# Patient Record
Sex: FEMALE | NOT HISPANIC OR LATINO | Employment: OTHER | URBAN - METROPOLITAN AREA
[De-identification: names, ages, dates, MRNs, and addresses within clinical notes are randomized per-mention and may not be internally consistent; named-entity substitution may affect disease eponyms.]

---

## 2017-03-23 ENCOUNTER — OFFICE VISIT (OUTPATIENT)
Dept: NEUROLOGY | Facility: MEDICAL CENTER | Age: 52
End: 2017-03-23

## 2017-03-23 VITALS
DIASTOLIC BLOOD PRESSURE: 90 MMHG | TEMPERATURE: 98.2 F | OXYGEN SATURATION: 96 % | HEIGHT: 65 IN | WEIGHT: 163 LBS | HEART RATE: 71 BPM | BODY MASS INDEX: 27.16 KG/M2 | SYSTOLIC BLOOD PRESSURE: 138 MMHG

## 2017-03-23 PROCEDURE — 99204 OFFICE O/P NEW MOD 45 MIN: CPT | Performed by: PSYCHIATRY & NEUROLOGY

## 2017-03-23 NOTE — PROGRESS NOTES
NEUROLOGY INITIAL NOTE    Referring Physician  Joanna Calderon M.D.    Chief Complaint:  headache    History of Present Illness:   For how long have you had headaches? 20 years and progressively getting worse in the last 3 years.  How many years? 20 years  Where in the head does it hurt? Fronto-temporal eyes radiating to the back and sometimes starts in the neck.    Duration of each individual episode  Treated: 6-8 hours  Untreated: last days      How many days do you keep ANY type of headache in any given month?  3 or fewer days______   Between 3 and 6 days______   Between 6 and 10 days_____  Between 11 and 14 days____  15 or more headache/days per month__Yes___    Why do you think you started having headaches?  Back of the head injuries s/p mechanical fall 3-4 years ago.    How many days per month, on average, is the headache so severe that you must interrupt your routine?  MIDAS 90    What makes the headache better, if anything?  Sleep/rest    What makes the headache worse, if anything?  Lights    Do you have any associated symptoms with your headaches?  Nausea? _Yes____  Light sensitivity? __Yes___  Noise sensitivity? ___No__    Do you drink any caffeine?  Yes_____ No____    How many days per week? 3-4  2 or fewer days______  3 or more days__Yes____    Have you tried any preventive medications for your headaches? Which ones?  Melatonin on occasion        Screening for depression was performed: she denies being depressed        Medical History:  GERD  Gastritis    Family History:  Mother- Type 2DM, Stroke,Dementia    Social History:  Tobacco use:None  Etoh:None  Recreational :None    ROS:  Constitutional: Denies fevers, Denies weight changes   Eyes: Denies changes in vision, no eye pain   Ears/Nose/Throat/Mouth: Denies nasal congestion or sore throat   Cardiovascular: Denies chest pain or palpitations   Respiratory: Denies SOB.   Gastrointestinal/Hepatic: Denies abdominal pain, nausea, vomiting, diarrhea,  constipation or GI bleeding   Genitourinary: Denies bladder dysfunction, dysuria or frequency   Musculoskeletal/Rheum: Denies joint pain and swelling   Skin/Breast: Denies rash, denies breast lumps or discharge   Neurological: Denies headache, confusion, memory loss or focal weakness/parasthesias   Psychiatric: denies mood disorder.   Endocrine: denies hx of diabetes or thyroid dysfunction   Heme/Oncology/Lymph Nodes: Denies enlarged lymph nodes, denies brusing or known bleeding disorder   Allergic/Immunologic: Denies hx of allergies   Screening for depression was performed  All other systems were reviewed and are negative (AMA/CMS criteria)       Neurological Exam:  Orientation to time, place, and person were normal  Recent and remote memory were normal  Attention span and concentration were normal  Language (naming, repetition, spontaneous speech) was normal  Fund of knowledge was normal    All cranial nerves were normal: Pupils were equally round and reactive to light    Motor: (tone, bulk and strength): normal    Sensation (all modalities) was normal    Reflexes were normal and symmetrical in both upper and lower extremities    Coordination (finger-to-nose, heel/knee/shin, rapid alternating movements) was normal. There was no ataxia, no tremors, and no dysmetria. Station and gait were normal    Peripheral pulses were normal       NEUROIMAGING:   MRI of the brain:Minimal supratentorial white matter disease with only a few rare punctate foci of FLAIR hyperintensity in the subcortical white matter. Common findings for the patient's age. Doubtful clinical significance. Otherwise, unremarkable MRI of the brain without contrast, essentially normal for the patient's age.        EEG: None    Medications:  Current Outpatient Prescriptions   Medication   • OMEPRAZOLE PO   • tramadol (ULTRAM) 50 MG Tab   • ranitidine (ZANTAC) 150 MG Tab   • sumatriptan (IMITREX) 50 MG Tab   • meclizine (ANTIVERT) 25 MG Tab   •  "Estradiol-Estriol-Progesterone (BIEST/PROGESTERONE) Cream     No current facility-administered medications for this visit.       Blood pressure 138/90, pulse 71, temperature 36.8 °C (98.2 °F), height 1.651 m (5' 5\"), weight 73.936 kg (163 lb), SpO2 96 %.            @CMP@    Patient Active Problem List    Diagnosis Date Noted   • Hyperlipidemia 10/29/2012     Priority: Low   • Obesity (BMI 30-39.9) 11/21/2016   • History of motion sickness 06/09/2016   • Chronic migraine without aura without status migrainosus, not intractable 02/16/2016   • History of peptic ulcer 01/26/2016   • GERD (gastroesophageal reflux disease)          Diagn[place]:  [place]  [facility] [place]/Recommendations:  She will try vitamin B2 x 8 weeks.  She does not want to try AEDs, antidepressants, or anti-HTN meds.  She will come back only as needed.      Depression screening was  Done_____  Not Done__x___        J. Kev Lehman MD  Professor of Neurology and Pediatrics  [facility]  [facility]  "

## 2017-04-03 ENCOUNTER — OFFICE VISIT (OUTPATIENT)
Dept: MEDICAL GROUP | Facility: CLINIC | Age: 52
End: 2017-04-03

## 2017-04-03 VITALS
RESPIRATION RATE: 16 BRPM | SYSTOLIC BLOOD PRESSURE: 128 MMHG | DIASTOLIC BLOOD PRESSURE: 80 MMHG | TEMPERATURE: 99 F | HEIGHT: 65 IN | HEART RATE: 66 BPM | OXYGEN SATURATION: 96 %

## 2017-04-03 DIAGNOSIS — M54.50 ACUTE MIDLINE LOW BACK PAIN WITHOUT SCIATICA: ICD-10-CM
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
## 2017-04-03 DIAGNOSIS — S89.91XA RIGHT KNEE INJURY, INITIAL ENCOUNTER: ICD-10-CM

## 2017-04-03 PROCEDURE — 99213 OFFICE O/P EST LOW 20 MIN: CPT | Performed by: NURSE PRACTITIONER

## 2017-04-03 RX ORDER — OMEPRAZOLE 20 MG/1
20 CAPSULE, DELAYED RELEASE ORAL DAILY
COMMUNITY
End: 2018-02-09

## 2017-04-03 RX ORDER — CELECOXIB 200 MG/1
200 CAPSULE ORAL 2 TIMES DAILY
Qty: 60 CAP | Refills: 0 | Status: SHIPPED | OUTPATIENT
Start: 2017-04-03 | End: 2017-06-01

## 2017-04-03 ASSESSMENT — ENCOUNTER SYMPTOMS
FEVER: 0
CHILLS: 0
FALLS: 1
TINGLING: 0
BACK PAIN: 1

## 2017-04-03 ASSESSMENT — PAIN SCALES - GENERAL: PAINLEVEL: 8=MODERATE-SEVERE PAIN

## 2017-04-03 NOTE — PROGRESS NOTES
Chief Complaint   Patient presents with   • Knee Pain     R knee pain with radiation to tail bone and below knee/ left knee lump  patient fell 3 weeks       HISTORY OF PRESENT ILLNESS: Patient is a 51 y.o. female established patient who presents today due to lower back pain, right knee pain and concern about her left knee. Pt reports that she fell about 2 days ago. She fell backward, land on her back with right knee bend to the back and straight left leg. Pt did not hear any pop sound but her right knee pain has gone worse. She is still able to walk without any assistance and able to place weight on both knee but it is super tender with pain level 8/10 to her right knee. The swelling to the right knee seems to get worse a little bit too. She has been using ice packing. She has tramadol for headache so she also took tramadol for her knee pain but it seems not to help at all. She also took ibuprofen but she is afraid to take too much because she gets stomach upset. She also feels that her left knee's bone seems to protrude a little bit but there is no limit on ROM and she is able to bend her left knee. She has dull pain to her tail bone.      Patient Active Problem List    Diagnosis Date Noted   • Hyperlipidemia 10/29/2012     Priority: Low   • Obesity (BMI 30-39.9) 11/21/2016   • History of motion sickness 06/09/2016   • Chronic migraine without aura without status migrainosus, not intractable 02/16/2016   • History of peptic ulcer 01/26/2016   • GERD (gastroesophageal reflux disease)        Allergies:Hydrocodone and Oxycodone    Current Outpatient Prescriptions   Medication Sig Dispense Refill   • omeprazole (PRILOSEC) 20 MG delayed-release capsule Take 20 mg by mouth every day.     • celecoxib (CELEBREX) 200 MG Cap Take 1 Cap by mouth 2 times a day. 60 Cap 0   • tramadol (ULTRAM) 50 MG Tab TAKE ONE TABLET BY MOUTH EVERY 6 HOURS AS NEEDED FOR MIGRAINE PAIN 60 Tab 3   • ranitidine (ZANTAC) 150 MG Tab Take 1 Tab by  "mouth every day. 30 Tab 6   • OMEPRAZOLE PO Take  by mouth.     • sumatriptan (IMITREX) 50 MG Tab Take 1-2 Tabs by mouth Once PRN for Migraine for up to 1 dose. 10 Tab 3   • meclizine (ANTIVERT) 25 MG Tab Take 1 Tab by mouth 3 times a day as needed. 30 Tab 0   • Estradiol-Estriol-Progesterone (BIEST/PROGESTERONE) Cream Apply 1 g to skin as directed every day. 30 g 2     No current facility-administered medications for this visit.       Social History   Substance Use Topics   • Smoking status: Never Smoker    • Smokeless tobacco: Never Used   • Alcohol Use: No       No family status information on file.     Family History   Problem Relation Age of Onset   • Heart Attack Mother    • Stroke Mother    • Lung Disease Mother    • Hypertension Mother    • Diabetes Mother      type II   • Dementia Mother          ROS:  Review of Systems   Constitutional: Negative for fever and chills.   Musculoskeletal: Positive for back pain, joint pain ( right knee pain more than left knee, able to bear the weight but right knee's swelling getting worse) and falls.   Neurological: Negative for tingling.        Objective:     Blood pressure 128/80, pulse 66, temperature 37.2 °C (99 °F), resp. rate 16, height 1.651 m (5' 5\"), SpO2 96 %.     Physical Exam:  Physical Exam   Constitutional: She is oriented to person, place, and time and well-developed, well-nourished, and in no distress.   HENT:   Head: Normocephalic and atraumatic.   Eyes: Conjunctivae are normal.   Neck: Neck supple. No JVD present.   Cardiovascular: Normal rate.    Musculoskeletal: She exhibits edema ( to right knee) and tenderness ( very tender to right knee, swelling to right lateral knee, unable to do much PE due to the pain. Left knee bone protrusion?? per pt, no abnormality noted, no swelling, no effusion, less tender, able to bend her knee, able to walk without assistance).   She has dull pain to her tail bone, no bruise or swelling noted to surrounding area. The pain " is reproducible. She mostly feels pain, no tingling or numbness. No saddle anesthesia.    Neurological: She is alert and oriented to person, place, and time.   Skin: Skin is warm.   Vitals reviewed.        Assessment/Plan:  1. Right knee injury, initial encounter, unable to perform exam due to the tenderness and swelling  - MR-KNEE-W/O RIGHT; Future  - celecoxib (CELEBREX) 200 MG Cap; Take 1 Cap by mouth 2 times a day.  Dispense: 60 Cap; Refill: 0  - elevated right leg, heating pad.     2. Fall, initial encounter  - DX-KNEE 2- LEFT; Future, does not seem to have deformity or fracture clinically but pt would like to be confirmed by image test  - DX-LUMBAR SPINE-2 OR 3 VIEWS; Future  - celecoxib (CELEBREX) 200 MG Cap; Take 1 Cap by mouth 2 times a day.  Dispense: 60 Cap; Refill: 0    3. Acute midline low back pain without sciatica  - DX-KNEE 2- LEFT; Future  - DX-LUMBAR SPINE-2 OR 3 VIEWS; Future  - celecoxib (CELEBREX) 200 MG Cap; Take 1 Cap by mouth 2 times a day.  Dispense: 60 Cap; Refill: 0

## 2017-04-03 NOTE — MR AVS SNAPSHOT
"Flavia Pena   4/3/2017 2:00 PM   Office Visit   MRN: [id number]    Department:  [facility]   Dept Phone:  [phone]    Description:  Female : 1965   Provider:  DENI Vaughn           Reason for Visit     Knee Pain R knee pain with radiation to tail bone and below knee/ left knee lump  patient fell 3 weeks      Allergies as of 4/3/2017     Allergen Noted Reactions    Hydrocodone 2016   Itching    Oxycodone 2016   Itching      You were diagnosed with     Right knee injury, initial encounter   [242549]       Fall, initial encounter   [[id number]]       Acute midline low back pain without sciatica   [[id number]]         Vital Signs     Blood Pressure Pulse Temperature Respirations Height       128/80 mmHg 66 37.2 °C (99 °F) 16 1.651 m (5' 5\")     Oxygen Saturation Smoking Status                96% Never Smoker           Basic Information     Date Of Birth Sex Race Ethnicity Preferred Language    1965 Female Unknown Non- English      Your appointments     2017  1:00 PM   Established Patient with Joanna Calderon M.D.   [facility]    [address] [place] [address] [address]  [facility] [id number] [phone]           You will be receiving a confirmation call a few days before your appointment from our automated call confirmation system.              Problem List              ICD-10-CM Priority Class Noted - Resolved    Hyperlipidemia E78.5 Low  10/29/2012 - Present    GERD (gastroesophageal reflux disease) K21.9   Unknown - Present    History of peptic ulcer Z87.11   2016 - Present    Chronic migraine without aura without status migrainosus, not intractable G43.709   2016 - Present    History of motion sickness Z87.898   2016 - Present    Obesity (BMI 30-39.9) E66.9   2016 - Present      Health Maintenance        Date Due Completion Dates    IMM DTaP/Tdap/Td Vaccine (1 - Tdap) 1983 ---    MAMMOGRAM 2016, " 8/11/2015, 8/11/2015, 7/30/2015, 11/22/2011    PAP SMEAR 6/19/2018 6/19/2015 (Prv Comp)    Override on 6/19/2015: Previously completed    COLONOSCOPY 3/1/2021 3/1/2011 (Prv Comp)    Override on 3/1/2011: Previously completed            Current Immunizations     No immunizations on file.      Below and/or attached are the medications your provider expects you to take. Review all of your home medications and newly ordered medications with your provider and/or pharmacist. Follow medication instructions as directed by your provider and/or pharmacist. Please keep your medication list with you and share with your provider. Update the information when medications are discontinued, doses are changed, or new medications (including over-the-counter products) are added; and carry medication information at all times in the event of emergency situations     Allergies:  HYDROCODONE - Itching     OXYCODONE - Itching               Medications  Valid as of: April 03, 2017 -  2:33 PM    Generic Name Brand Name Tablet Size Instructions for use    Celecoxib (Cap) CELEBREX 200 MG Take 1 Cap by mouth 2 times a day.        Estradiol-Estriol-Progesterone (Cream) BIEST/PROGESTERONE  Apply 1 g to skin as directed every day.        Meclizine HCl (Tab) ANTIVERT 25 MG Take 1 Tab by mouth 3 times a day as needed.        Omeprazole   Take  by mouth.        Omeprazole (CAPSULE DELAYED RELEASE) PRILOSEC 20 MG Take 20 mg by mouth every day.        RaNITidine HCl (Tab) ZANTAC 150 MG Take 1 Tab by mouth every day.        SUMAtriptan Succinate (Tab) IMITREX 50 MG Take 1-2 Tabs by mouth Once PRN for Migraine for up to 1 dose.        TraMADol HCl (Tab) ULTRAM 50 MG TAKE ONE TABLET BY MOUTH EVERY 6 HOURS AS NEEDED FOR MIGRAINE PAIN        .                 Medicines prescribed today were sent to:     [facility] [place] (), [place] - [address]    [address] [place] [address] [place] () [place] [address]    Phone: [phone] Fax: [phone]    Open 24  Hours?: No      Medication refill instructions:       If your prescription bottle indicates you have medication refills left, it is not necessary to call your provider’s office. Please contact your pharmacy and they will refill your medication.    If your prescription bottle indicates you do not have any refills left, you may request refills at any time through one of the following ways: The online [employer] system (except Urgent Care), by calling your provider’s office, or by asking your pharmacy to contact your provider’s office with a refill request. Medication refills are processed only during regular business hours and may not be available until the next business day. Your provider may request additional information or to have a follow-up visit with you prior to refilling your medication.   *Please Note: Medication refills are assigned a new Rx number when refilled electronically. Your pharmacy may indicate that no refills were authorized even though a new prescription for the same medication is available at the pharmacy. Please request the medicine by name with the pharmacy before contacting your provider for a refill.        Your To Do List     Future Labs/Procedures Complete By Expires    DX-KNEE 2- LEFT  As directed 4/3/2018    DX-LUMBAR SPINE-2 OR 3 VIEWS  As directed 4/3/2018    MR-KNEE-W/O RIGHT  As directed 4/3/2018         [email or web address] Access Code: Activation code not generated  Current [employer] Status: Active

## 2017-04-04 ENCOUNTER — HOSPITAL ENCOUNTER (OUTPATIENT)
Dept: RADIOLOGY | Facility: MEDICAL CENTER | Age: 52
End: 2017-04-04
Attending: NURSE PRACTITIONER
Payer: COMMERCIAL
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
## 2017-04-04 DIAGNOSIS — M54.50 ACUTE MIDLINE LOW BACK PAIN WITHOUT SCIATICA: ICD-10-CM

## 2017-04-04 PROCEDURE — 72100 X-RAY EXAM L-S SPINE 2/3 VWS: CPT

## 2017-04-04 PROCEDURE — 73560 X-RAY EXAM OF KNEE 1 OR 2: CPT | Mod: LT

## 2017-06-01 ENCOUNTER — OFFICE VISIT (OUTPATIENT)
Dept: MEDICAL GROUP | Facility: CLINIC | Age: 52
End: 2017-06-01
Payer: COMMERCIAL

## 2017-06-01 VITALS
SYSTOLIC BLOOD PRESSURE: 140 MMHG | RESPIRATION RATE: 16 BRPM | DIASTOLIC BLOOD PRESSURE: 80 MMHG | HEIGHT: 65 IN | HEART RATE: 70 BPM | OXYGEN SATURATION: 99 % | TEMPERATURE: 98.1 F

## 2017-06-01 DIAGNOSIS — Z12.39 SCREENING BREAST EXAMINATION: ICD-10-CM

## 2017-06-01 DIAGNOSIS — G43.709 CHRONIC MIGRAINE WITHOUT AURA WITHOUT STATUS MIGRAINOSUS, NOT INTRACTABLE: ICD-10-CM

## 2017-06-01 PROCEDURE — 99213 OFFICE O/P EST LOW 20 MIN: CPT | Performed by: FAMILY MEDICINE

## 2017-06-01 RX ORDER — PROMETHAZINE HYDROCHLORIDE 25 MG/1
25 SUPPOSITORY RECTAL EVERY 6 HOURS PRN
Qty: 12 SUPPOSITORY | Refills: 0 | Status: SHIPPED | OUTPATIENT
Start: 2017-06-01 | End: 2019-07-29

## 2017-06-01 RX ORDER — TOPIRAMATE 25 MG/1
25 TABLET ORAL 2 TIMES DAILY
Qty: 60 TAB | Refills: 6 | Status: SHIPPED | OUTPATIENT
Start: 2017-06-01 | End: 2018-02-09

## 2017-06-01 RX ORDER — TRAMADOL HYDROCHLORIDE 50 MG/1
100 TABLET ORAL EVERY 12 HOURS PRN
Qty: 120 TAB | Refills: 5 | Status: SHIPPED | OUTPATIENT
Start: 2017-06-01 | End: 2017-12-26 | Stop reason: SDUPTHER

## 2017-06-01 ASSESSMENT — PATIENT HEALTH QUESTIONNAIRE - PHQ9: CLINICAL INTERPRETATION OF PHQ2 SCORE: 1

## 2017-06-01 NOTE — PROGRESS NOTES
CC: Migraine    HPI:   Flavia presents today with the following.    1. Chronic migraine without aura without status migrainosus, not intractable  She is having migraine headaches daily. She has severe migraines with nausea, vomiting, photophobia and phonophobia about 4 times a month. These seem to start in the back of the neck. Her daily migraine is relatively mild and controlled by use of the tramadol. She is also having to use high-dose ibuprofen and Tylenol which are creating significant GI upset for her. She is out of the tramadol. She would like to use it again but is concerned that she was having some tingling in the arms but went away when she ran out of the medication. She did see neurology. She did not understand what she was being offered and was confused with the list of medications. Have discussed with her the 3 phases of treatment of migraine including prevention, treatment and rescue. Unfortunately her onset of migraine is less than 15 minutes from the time she gets a warning. The warning is primarily a feeling of pressure in the back of her neck. She does not get a visual aura or other warning.  She has tried various stretching exercises and ice and heat to the neck and that does not seem to make a significant difference.  She did use the Imitrex spray in the past with no significant relief. She has been having quite a bit of protracted vomiting. She has tried meclizine for this in the past with no help. She does not recall ever trying Phenergan.    2. Screening breast examination  She actually had this done in August and is planning to do this in August again. She will be seeing her gynecologist around that time.      Patient Active Problem List    Diagnosis Date Noted   • Hyperlipidemia 10/29/2012     Priority: Low   • Obesity (BMI 30-39.9) 11/21/2016   • History of motion sickness 06/09/2016   • Chronic migraine without aura without status migrainosus, not intractable 02/16/2016   • History of  "peptic ulcer 01/26/2016   • GERD (gastroesophageal reflux disease)        Current Outpatient Prescriptions   Medication Sig Dispense Refill   • topiramate (TOPAMAX) 25 MG Tab Take 1 Tab by mouth 2 times a day. 60 Tab 6   • promethazine (PHENERGAN) 25 MG Suppos Insert 1 Suppository in rectum every 6 hours as needed for Nausea/Vomiting. 12 Suppository 0   • tramadol (ULTRAM) 50 MG Tab Take 2 Tabs by mouth every 12 hours as needed (migraine). 120 Tab 5   • omeprazole (PRILOSEC) 20 MG delayed-release capsule Take 20 mg by mouth every day.     • meclizine (ANTIVERT) 25 MG Tab Take 1 Tab by mouth 3 times a day as needed. 30 Tab 0   • ranitidine (ZANTAC) 150 MG Tab Take 1 Tab by mouth every day. 30 Tab 6   • Estradiol-Estriol-Progesterone (BIEST/PROGESTERONE) Cream Apply 1 g to skin as directed every day. 30 g 2     No current facility-administered medications for this visit.         Allergies as of 06/01/2017 - Anders as Reviewed 06/01/2017   Allergen Reaction Noted   • Hydrocodone Itching 05/18/2016   • Oxycodone Itching 05/18/2016        ROS: As per HPI.    /80 mmHg  Pulse 70  Temp(Src) 36.7 °C (98.1 °F)  Resp 16  Ht 1.651 m (5' 5\")  Wt   SpO2 99%    Physical Exam:  Gen:         Alert and oriented, No apparent distress.  Lucid and fluent.  HEENT:   EOMI, PERRLA.  Oropharynx well hydrated.  Neck:       Posterior cervical spasm, range of motion is full, no JVD or carotid bruits appreciated. No cervical adenopathy or neck mass appreciated.  Lungs:     Clear to auscultation bilaterally  CV:          Regular rate and rhythm. No murmurs, rubs or gallops.               Ext:          No clubbing, cyanosis, edema.      Assessment and Plan.   51 y.o. female with the following issues.    1. Chronic migraine without aura without status migrainosus, not intractable  Medications are discussed in detail. I have urged her to give the topiramate to try. With the frequency and severity of her migraines preventative medications " are going to be of the most help to her. The Imitrex has not been helpful for treatment. She is urged to continue using the Coca[employer] and 2 aspirin when appropriate. She is also using the tramadol for rescue. If the symptoms with the tramadol return she will let me know and we will try something else.  - topiramate (TOPAMAX) 25 MG Tab; Take 1 Tab by mouth 2 times a day.  Dispense: 60 Tab; Refill: 6  - promethazine (PHENERGAN) 25 MG Suppos; Insert 1 Suppository in rectum every 6 hours as needed for Nausea/Vomiting.  Dispense: 12 Suppository; Refill: 0  - tramadol (ULTRAM) 50 MG Tab; Take 2 Tabs by mouth every 12 hours as needed (migraine).  Dispense: 120 Tab; Refill: 5    2. Screening breast examination  She has actually already satisfied this, order placed in case she wants to use that for August but she does prefer using the order from her gynecologist.  - MA-MAMMO SCREENING BILAT W/NAKUL W/CAD; Future

## 2017-06-01 NOTE — MR AVS SNAPSHOT
"        Flavia Hansenso   2017 10:00 AM   Office Visit   MRN: [id number]    Department:  [facility]   Dept Phone:  [phone]    Description:  Female : 1965   Provider:  Joanna Calderon M.D.           Reason for Visit     Migraine           Allergies as of 2017     Allergen Noted Reactions    Hydrocodone 2016   Itching    Oxycodone 2016   Itching      You were diagnosed with     Chronic migraine without aura without status migrainosus, not intractable   [[id number]]       Screening breast examination   [[id number]]         Vital Signs     Blood Pressure Pulse Temperature Respirations Height       140/80 mmHg 70 36.7 °C (98.1 °F) 16 1.651 m (5' 5\")     Oxygen Saturation Smoking Status                99% Never Smoker           Basic Information     Date Of Birth Sex Race Ethnicity Preferred Language    1965 Female Unknown Non- English      Problem List              ICD-10-CM Priority Class Noted - Resolved    Hyperlipidemia E78.5 Low  10/29/2012 - Present    GERD (gastroesophageal reflux disease) K21.9   Unknown - Present    History of peptic ulcer Z87.11   2016 - Present    Chronic migraine without aura without status migrainosus, not intractable G43.709   2016 - Present    History of motion sickness Z87.898   2016 - Present    Obesity (BMI 30-39.9) E66.9   2016 - Present      Health Maintenance        Date Due Completion Dates    IMM DTaP/Tdap/Td Vaccine (1 - Tdap) 2022 (Originally 1983) ---    MAMMOGRAM 2017 (Done), 2015, 2011    Override on 2016: Done    PAP SMEAR 2018 (Prv Comp)    Override on 2015: Previously completed    COLONOSCOPY 3/1/2021 3/1/2011 (Prv Comp)    Override on 3/1/2011: Previously completed            Current Immunizations     No immunizations on file.      Below and/or attached are the medications your provider expects you to take. Review all of your home medications and " newly ordered medications with your provider and/or pharmacist. Follow medication instructions as directed by your provider and/or pharmacist. Please keep your medication list with you and share with your provider. Update the information when medications are discontinued, doses are changed, or new medications (including over-the-counter products) are added; and carry medication information at all times in the event of emergency situations     Allergies:  HYDROCODONE - Itching     OXYCODONE - Itching               Medications  Valid as of: June 01, 2017 - 12:46 PM    Generic Name Brand Name Tablet Size Instructions for use    Estradiol-Estriol-Progesterone (Cream) BIEST/PROGESTERONE  Apply 1 g to skin as directed every day.        Omeprazole (CAPSULE DELAYED RELEASE) PRILOSEC 20 MG Take 20 mg by mouth every day.        Promethazine HCl (Suppos) PHENERGAN 25 MG Insert 1 Suppository in rectum every 6 hours as needed for Nausea/Vomiting.        RaNITidine HCl (Tab) ZANTAC 150 MG Take 1 Tab by mouth every day.        Topiramate (Tab) TOPAMAX 25 MG Take 1 Tab by mouth 2 times a day.        TraMADol HCl (Tab) ULTRAM 50 MG Take 2 Tabs by mouth every 12 hours as needed (migraine).        .                 Medicines prescribed today were sent to:     [facility] [address] [place] [address] (), [address] () [place] [address]    Phone: [phone] Fax: [phone]    Open 24 Hours?: No      Medication refill instructions:       If your prescription bottle indicates you have medication refills left, it is not necessary to call your provider’s office. Please contact your pharmacy and they will refill your medication.    If your prescription bottle indicates you do not have any refills left, you may request refills at any time through one of the following ways: The online [employer] system (except Urgent Care), by calling your provider’s office, or by asking your pharmacy to contact your provider’s  office with a refill request. Medication refills are processed only during regular business hours and may not be available until the next business day. Your provider may request additional information or to have a follow-up visit with you prior to refilling your medication.   *Please Note: Medication refills are assigned a new Rx number when refilled electronically. Your pharmacy may indicate that no refills were authorized even though a new prescription for the same medication is available at the pharmacy. Please request the medicine by name with the pharmacy before contacting your provider for a refill.        Your To Do List     Future Labs/Procedures Complete By Expires    MA-MAMMO SCREENING BILAT W/NAKUL W/CAD  As directed 6/1/2018         [employer] Access Code: Activation code not generated  Current [employer] Status: Active

## 2017-09-06 ENCOUNTER — OFFICE VISIT (OUTPATIENT)
Dept: MEDICAL GROUP | Facility: CLINIC | Age: 52
End: 2017-09-06
Payer: COMMERCIAL

## 2017-09-06 VITALS
OXYGEN SATURATION: 92 % | SYSTOLIC BLOOD PRESSURE: 130 MMHG | WEIGHT: 191 LBS | HEIGHT: 65 IN | HEART RATE: 66 BPM | DIASTOLIC BLOOD PRESSURE: 80 MMHG | BODY MASS INDEX: 31.82 KG/M2 | RESPIRATION RATE: 16 BRPM | TEMPERATURE: 97.9 F

## 2017-09-06 DIAGNOSIS — R82.998 URINE LEUKOCYTES: ICD-10-CM

## 2017-09-06 DIAGNOSIS — R61 NIGHT SWEATS: ICD-10-CM

## 2017-09-06 DIAGNOSIS — R10.2 PELVIC PAIN: ICD-10-CM

## 2017-09-06 DIAGNOSIS — E66.9 OBESITY (BMI 30-39.9): ICD-10-CM

## 2017-09-06 PROCEDURE — 81002 URINALYSIS NONAUTO W/O SCOPE: CPT | Performed by: FAMILY MEDICINE

## 2017-09-06 PROCEDURE — 99214 OFFICE O/P EST MOD 30 MIN: CPT | Performed by: FAMILY MEDICINE

## 2017-09-06 RX ORDER — SULFAMETHOXAZOLE AND TRIMETHOPRIM 800; 160 MG/1; MG/1
1 TABLET ORAL 2 TIMES DAILY
Qty: 14 TAB | Refills: 0 | Status: SHIPPED | OUTPATIENT
Start: 2017-09-06 | End: 2017-09-13

## 2017-09-06 NOTE — PROGRESS NOTES
CC: Pelvic pain, urinary problems, night sweats    HPI:   Flavia presents today with the following.    1. Pelvic pain  She has had persistent pelvic pain now for the last 2-3 weeks or longer. This is right sided. This is not affected by eating or by position. She recalls lifting nothing heavy.  Discussed lab testing in general testing including possible CT or ultrasound.     2. Frequent urination  She has frequent urination and some pressure in the last 2 weeks. This seems to be tied in with her pelvic pain. She denies actual burning per se. She denies any change in color. She feels the smell is more intense. Since from the location of her pain she thought it was bladder related she took one Bactrim DS that was left over. She took this about 5 days ago. And then for the last 4 days she has been taking leftover tetracycline. She is not sure how old it is. She will check the bottle at home. She will let me know if this is greater than 3 years as we may need to check her kidneys urgently. I think she should get lab tests to check this anyway, discussed with patient.    3. Night sweats  Over the last 2 weeks or so she has noted occasional soaking night sweats. I will note that her  acts as if he thinks she is exaggerating. Patient is very concerned over a fear of cancer. She does state this bothers her a lot. She had a hysterectomy nearly 30 years ago with bilateral oophorectomy for very severe endometriosis. She is not on oral hormones and has not been for over 10 years. She was on a transdermal cream but has stopped this.    4. Obesity (BMI 30-39.9)  She is making good progress with her weight loss. She has lost another 4 pounds compared to the last time she would let us weigh. She is active with her animals. Discussed that just a few more pounds weight loss and she will no longer be in the obese category. She is encouraged.    Her  had very severe medical problems this last year which have increased  "her anxiety.        Patient Active Problem List    Diagnosis Date Noted   • Hyperlipidemia 10/29/2012     Priority: Low   • Obesity (BMI 30-39.9) 11/21/2016   • History of motion sickness 06/09/2016   • Chronic migraine without aura without status migrainosus, not intractable 02/16/2016   • History of peptic ulcer 01/26/2016   • GERD (gastroesophageal reflux disease)        Current Outpatient Prescriptions   Medication Sig Dispense Refill   • sulfamethoxazole-trimethoprim (BACTRIM DS) 800-160 MG tablet Take 1 Tab by mouth 2 times a day for 7 days. 14 Tab 0   • topiramate (TOPAMAX) 25 MG Tab Take 1 Tab by mouth 2 times a day. 60 Tab 6   • promethazine (PHENERGAN) 25 MG Suppos Insert 1 Suppository in rectum every 6 hours as needed for Nausea/Vomiting. 12 Suppository 0   • tramadol (ULTRAM) 50 MG Tab Take 2 Tabs by mouth every 12 hours as needed (migraine). 120 Tab 5   • omeprazole (PRILOSEC) 20 MG delayed-release capsule Take 20 mg by mouth every day.     • ranitidine (ZANTAC) 150 MG Tab Take 1 Tab by mouth every day. 30 Tab 6   • Estradiol-Estriol-Progesterone (BIEST/PROGESTERONE) Cream Apply 1 g to skin as directed every day. 30 g 2     No current facility-administered medications for this visit.          Allergies as of 09/06/2017 - Reviewed 09/06/2017   Allergen Reaction Noted   • Hydrocodone Itching 05/18/2016   • Oxycodone Itching 05/18/2016        ROS: As per HPI.    /80   Pulse 66   Temp 36.6 °C (97.9 °F)   Resp 16   Ht 1.651 m (5' 5\")   Wt 86.6 kg (191 lb)   SpO2 92%   BMI 31.78 kg/m²     Physical Exam:  Gen:         Alert and oriented, No apparent distress.  Lucid and fluent.  Neck:       Full range of motion, no JVD or carotid bruits appreciated. There is one enlarged lymph node in the left posterior cervical chain. There are several very shotty lymph nodes which are quite small, less than a half centimeter in the left anterior chain. No lymphadenopathy is appreciated in the right posterior " cervical chain or the right anterior cervical chain. There is no adenopathy appreciated bilaterally in the supraclavicular region. No neck mass is appreciated. No thyromegaly is appreciated.  Lungs:     Clear to auscultation bilaterally  CV:          Regular rate and rhythm. No murmurs, rubs or gallops.      Abd:         Soft, Mild bloating, no hepatosplenomegaly or masses noted. The right lower pelvis just to the right of midline is tender. No erythema appreciated. No mass or edema appreciated. There is no rebound though there is slight guarding.          Ext:          No clubbing, cyanosis, no peripheral edema.    Lab Results   Component Value Date/Time    POCCOLOR yellow 09/07/2017 09:54 AM    POCAPPEAR clear 09/07/2017 09:54 AM    POCLEUKEST tr 09/07/2017 09:54 AM    POCNITRITE neg 09/07/2017 09:54 AM    POCUROBILIGE neg 09/07/2017 09:54 AM    POCPROTEIN tr 09/07/2017 09:54 AM    POCURPH 6.0 09/07/2017 09:54 AM    POCBLOOD neg 09/07/2017 09:54 AM    POCSPGRV 1.005 09/07/2017 09:54 AM    POCKETONES neg 09/07/2017 09:54 AM    POCBILIRUBIN neg 09/07/2017 09:54 AM    POCGLUCUA neg 09/07/2017 09:54 AM      Assessment and Plan.   52 y.o. female with the following issues.      1. Pelvic pain  UA shows trace leukocytes but is otherwise negative. Urine is very dilute. Patient has been stopped taking various leftover antibiotics. Orders are discussed and placed. CT order for abdomen and pelvis is discussed and written. Her insurance requires her to use [facility] for the best coverage.  - CBC WITH DIFFERENTIAL; Future  - COMP METABOLIC PANEL; Future  - POCT Urinalysis    2. Urine leukocytes  Orders discussed and placed. She has been taking several antibiotics so I will treat presumptively with Bactrim DS twice a day for a week. If she continues to have symptoms after that we will have a repeat urine test with culture after 5 days of no antibiotics.  - sulfamethoxazole-trimethoprim (BACTRIM DS) 800-160 MG tablet; Take 1 Tab by  mouth 2 times a day for 7 days.  Dispense: 14 Tab; Refill: 0  - CBC WITH DIFFERENTIAL; Future  - COMP METABOLIC PANEL; Future  - POCT Urinalysis    3. Night sweats  Orders are discussed and placed. Patient had hysterectomy many years ago and it is unlikely that she is undergoing menopause. This may be illness associated.    4. Obesity (BMI 30-39.9)  She has been making progress with her weight loss and is doing well overall.  - Patient identified as having weight management issue.  Appropriate orders and counseling given.

## 2017-09-07 ENCOUNTER — TELEPHONE (OUTPATIENT)
Dept: MEDICAL GROUP | Facility: CLINIC | Age: 52
End: 2017-09-07

## 2017-09-07 LAB
APPEARANCE UR: CLEAR
BILIRUB UR STRIP-MCNC: NORMAL MG/DL
COLOR UR AUTO: YELLOW
GLUCOSE UR STRIP.AUTO-MCNC: NORMAL MG/DL
KETONES UR STRIP.AUTO-MCNC: NORMAL MG/DL
LEUKOCYTE ESTERASE UR QL STRIP.AUTO: NORMAL
NITRITE UR QL STRIP.AUTO: NORMAL
PH UR STRIP.AUTO: 6 [PH] (ref 5–8)
PROT UR QL STRIP: NORMAL MG/DL
RBC UR QL AUTO: NORMAL
SP GR UR STRIP.AUTO: 1
UROBILINOGEN UR STRIP-MCNC: NORMAL MG/DL

## 2017-09-07 NOTE — TELEPHONE ENCOUNTER
(Voicemail) Waleska with [facility] called needs the CT order to be change to CT of abdomen & pelvis with only. They don't do with or without because this is double the radiation and their protocol is only with. Waleska will fax the order back again. Thank you

## 2017-09-07 NOTE — TELEPHONE ENCOUNTER
Pt's  called to state pt isn't doing better. Feeling weak, had labs done this morning. Has been vomiting on and off today. Pt feels like she is having SOB, no chest pain. States this has been going on since shes been sick but has gotten worse today.      states they would like to avoid the ER as much as possible (would cost them $1000) but would be willing to go to .

## 2017-09-08 RX ORDER — ONDANSETRON 4 MG/1
4 TABLET, FILM COATED ORAL EVERY 6 HOURS PRN
Qty: 20 TAB | Refills: 2 | Status: SHIPPED | OUTPATIENT
Start: 2017-09-08 | End: 2019-07-29

## 2017-09-08 NOTE — TELEPHONE ENCOUNTER
Called pt for an update of patients health status. Per pt she is scheduled for her CT nxt week and is no longer having vomiting or sob. Pt does state she is having some nausea and is requesting a prescription for zofran. Please advise, thank you.

## 2017-09-08 NOTE — TELEPHONE ENCOUNTER
The CT change is just fine with me. As you know, I signed it earlier today and I saw you fax it I am glad you are faxing it again.

## 2017-09-13 ENCOUNTER — PATIENT MESSAGE (OUTPATIENT)
Dept: MEDICAL GROUP | Facility: CLINIC | Age: 52
End: 2017-09-13

## 2017-09-13 DIAGNOSIS — R10.11 RIGHT UPPER QUADRANT PAIN: ICD-10-CM

## 2017-09-13 DIAGNOSIS — K76.89 HEPATIC CYST: ICD-10-CM

## 2017-09-13 DIAGNOSIS — R39.198 VOIDING DIFFICULTY: ICD-10-CM

## 2017-09-13 DIAGNOSIS — Z87.11 HISTORY OF PEPTIC ULCER: ICD-10-CM

## 2017-09-13 DIAGNOSIS — K21.9 GASTROESOPHAGEAL REFLUX DISEASE, ESOPHAGITIS PRESENCE NOT SPECIFIED: ICD-10-CM

## 2017-09-14 ENCOUNTER — PATIENT MESSAGE (OUTPATIENT)
Dept: MEDICAL GROUP | Facility: CLINIC | Age: 52
End: 2017-09-14

## 2017-09-15 ENCOUNTER — PATIENT MESSAGE (OUTPATIENT)
Dept: MEDICAL GROUP | Facility: CLINIC | Age: 52
End: 2017-09-15

## 2017-09-15 NOTE — TELEPHONE ENCOUNTER
From: Flavia Pena  To: Joanna Calderon M.D.  Sent: 9/14/2017 8:02 PM PDT  Subject: RE:the liver cysts are very unlikely to be causing pain    Yes please send it to [facility] also I was never told I had a peptic ulcer all they said was that I had no mucosa lining in my stomach they never said I had a peptic ulcer.  ----- Message -----  From: Joanna Calderon M.D.  Sent: 9/14/2017 6:50 PM PDT  To: Flavia Pena  Subject: the liver cysts are very unlikely to be causing pain  It is unlikely that the liver cysts are causing the pain. As the radiologist says, statistically these are benign. However, I agree that we need to further characterize. There are four in the left lobe of the liver and one in the right. The gastroenterologist will look at your films when they see you. We will also be able to see if there were bladder stones provided that the films show that region. It is still most likely that your pain is related to your previous peptic ulcer disease and possible continuing stomach issues. I have placed the gastroenterology referral. I would like to suggest an ultrasound of the bladder and pelvic area. I will placed that order as well. I presume I should send that order to [facility]. Am sending the MRI order there as well.

## 2017-09-15 NOTE — TELEPHONE ENCOUNTER
From: Flavia Pena  To: Joanna Calderon M.D.  Sent: 9/15/2017 7:57 AM PDT  Subject: RE:i have sent it to [facility]    Ok thank you!   Since I had a CT scan with contrast of my abdomen and pelvis, doesn't that be tell you everything you need to know about my pancreas, liver, bladder, kidneys and colon? So why would I need an MRI and ultrasound?  ----- Message -----  From: Joanna Calderon M.D.  Sent: 9/14/2017 10:18 PM PDT  To: Flavia Pena  Subject: i have sent it to [facility]  The orders have been sent. Actually a peptic ulcer is a loss of lining of the stomach.

## 2017-09-16 NOTE — TELEPHONE ENCOUNTER
From: Flavia Pena  To: Joanna Calderon M.D.  Sent: 9/15/2017 8:55 AM PDT  Subject: RE:MRI    I just spoke with the office of Dr. Mckeon and they can't get me in until November 2nd but they said to have my doctor call them and you can get me in sooner.  .  ----- Message -----  From: Joanna Calderon M.D.  Sent: 9/15/2017 8:02 AM PDT  To: Flavia Pena  Subject: MRI  No, the liver MRI gives much more detail. That has been suggested by the radiologist but also when speaking to gastroenterologist about this kind of issue in the past they invariably want an MRI to give more liver detail. That typically will settle the question whether this is a benign process or a problem. If we had known in the first place that you had liver cysts we would've gone straight to the MRI.

## 2017-09-16 NOTE — TELEPHONE ENCOUNTER
From: Flavia Pena  To: Joanna Calderon M.D.  Sent: 9/15/2017 6:59 PM PDT  Subject: RE:sooner appointment with GI    I agree, but waiting 7 weeks can't be good for me! Can you get me in sooner with another gastro dr?  ----- Message -----  From: Joanna Calderon M.D.  Sent: 9/15/2017 6:47 PM PDT  To: Flavia Pena  Subject: sooner appointment with GI  The appointment with GI that is sooner would be with the physician assistant rather than an M.D. I really think the broader experience of Dr. Mckeon is worth waiting for. Please let me know your thoughts.

## 2017-12-26 DIAGNOSIS — G43.709 CHRONIC MIGRAINE WITHOUT AURA WITHOUT STATUS MIGRAINOSUS, NOT INTRACTABLE: ICD-10-CM

## 2017-12-26 RX ORDER — TRAMADOL HYDROCHLORIDE 50 MG/1
100 TABLET ORAL EVERY 12 HOURS PRN
Qty: 120 TAB | Refills: 2 | Status: SHIPPED
Start: 2017-12-26 | End: 2018-02-09 | Stop reason: SDUPTHER

## 2017-12-26 RX ORDER — PANTOPRAZOLE SODIUM 40 MG/1
TABLET, DELAYED RELEASE ORAL
Refills: 11 | COMMUNITY
Start: 2017-11-29 | End: 2019-07-29

## 2018-01-17 ENCOUNTER — PATIENT MESSAGE (OUTPATIENT)
Dept: MEDICAL GROUP | Facility: CLINIC | Age: 53
End: 2018-01-17

## 2018-01-17 DIAGNOSIS — M47.816 SPONDYLOSIS OF LUMBAR REGION WITHOUT MYELOPATHY OR RADICULOPATHY: ICD-10-CM

## 2018-01-18 ENCOUNTER — PATIENT MESSAGE (OUTPATIENT)
Dept: MEDICAL GROUP | Facility: CLINIC | Age: 53
End: 2018-01-18

## 2018-01-18 DIAGNOSIS — M54.2 BILATERAL POSTERIOR NECK PAIN: ICD-10-CM

## 2018-01-18 DIAGNOSIS — M47.816 SPONDYLOSIS OF LUMBAR REGION WITHOUT MYELOPATHY OR RADICULOPATHY: ICD-10-CM

## 2018-01-18 DIAGNOSIS — G44.86 CERVICOGENIC HEADACHE: ICD-10-CM

## 2018-01-18 NOTE — TELEPHONE ENCOUNTER
From: Flavia Pena  To: Kian Calderon M.D.  Sent: 1/18/2018 11:55 AM PST  Subject: Procedure Question    Yes, I meant [facility]. I'm pretty sure it is the arthritis in my back in the bone spur in my neck causing all of my grief! Whatever you think is the best I will do. I'll go to the [facility] and the other place you're talking about just let me know when the referrals are ready. I know the bone spur in my neck didn't look like much on film but it's really causing my neck to be sore, swollen and stiff.   Thank you so much   Terence  ----- Message -----  From: Kian Calderon M.D.  Sent: 1/17/2018 6:02 PM PST  To: Flavia Pena  Subject: RE: Procedure Question  Not sure if you mean [facility], [facility] or exactly who. Do you have someone in particular you wish to see? I am happy to place the referral I just need a little more direction. As you know, your MRIs last year were basically normal. You don't have any significant disc disease in your neck or thorax. In the spine there is considerable arthritis but still not much disc disease. Not sure what they are going to be able to do. I would actually suggest a physical medicine and rehabilitation physician such as  or Dr. Moya. They tend to evaluate the entire picture including muscles and can do injections in the spine and the musculature to figure out exactly where the problem is.    Kian Calderon M.D.      ----- Message -----   From: Flavia Pena   Sent: 1/17/2018 4:28 PM PST   To: Kian Calderon M.D.  Subject: Procedure Question    Sybil De Leon,  I need to get a referral to see [facility] to get a consultation to see what they can do for me to help me get out of my headaches that are turning into migraines that are due to my spine pain. I'm having to take an NSAID for the inflammation in my back on top of the Tramadol and it's making my stomach worse. I need to see a specialist for my spine to see if  there's anything they can do to help me get out of the pain that my spine is causing. The severe arthritis in my back is what's causing all the pain in my back which in turn is causing my headaches. I am not able to function or work due to the watts with my headaches and back pain that I live with 24/7.  Thank you,  Terence

## 2018-02-07 ENCOUNTER — PATIENT MESSAGE (OUTPATIENT)
Dept: MEDICAL GROUP | Facility: CLINIC | Age: 53
End: 2018-02-07

## 2018-02-09 ENCOUNTER — OFFICE VISIT (OUTPATIENT)
Dept: MEDICAL GROUP | Facility: CLINIC | Age: 53
End: 2018-02-09

## 2018-02-09 VITALS
OXYGEN SATURATION: 94 % | RESPIRATION RATE: 16 BRPM | DIASTOLIC BLOOD PRESSURE: 78 MMHG | HEIGHT: 65 IN | BODY MASS INDEX: 31.16 KG/M2 | SYSTOLIC BLOOD PRESSURE: 140 MMHG | TEMPERATURE: 98.8 F | WEIGHT: 187 LBS | HEART RATE: 84 BPM

## 2018-02-09 DIAGNOSIS — E66.9 OBESITY (BMI 30-39.9): ICD-10-CM

## 2018-02-09 DIAGNOSIS — G43.709 CHRONIC MIGRAINE WITHOUT AURA WITHOUT STATUS MIGRAINOSUS, NOT INTRACTABLE: ICD-10-CM

## 2018-02-09 PROCEDURE — 99213 OFFICE O/P EST LOW 20 MIN: CPT | Performed by: FAMILY MEDICINE

## 2018-02-09 RX ORDER — TRAMADOL HYDROCHLORIDE 50 MG/1
100 TABLET ORAL EVERY 12 HOURS PRN
Qty: 120 TAB | Refills: 5 | Status: SHIPPED | OUTPATIENT
Start: 2018-02-09 | End: 2018-04-16 | Stop reason: SDUPTHER

## 2018-02-09 RX ORDER — GABAPENTIN 300 MG/1
600 CAPSULE ORAL
Refills: 0 | COMMUNITY
Start: 2018-02-07 | End: 2018-08-29 | Stop reason: SDUPTHER

## 2018-02-09 ASSESSMENT — PAIN SCALES - GENERAL: PAINLEVEL: 6=MODERATE PAIN

## 2018-02-10 NOTE — PROGRESS NOTES
Chief Complaint   Patient presents with   • Migraine   • Obesity       Subjective:     HPI:   Flavia Pena presents today with the followin. Chronic migraine without aura without status migrainosus, not intractable  She is getting help from the gabapentin from Dr. Moya.  She is going to go to PT and will follow with Dr. Moya and possibly get cervical spine injections.  The headaches are daily now for many months.  She will be seeing neurology soon.  Discussed various preventive medications.  Discussed botox injections.  The tramadol helps quite a bit if she takes it with the tramadol.  [facility] board of pharmacy interface is reviewed.  No inconsistencies are found.  Her average MME is 0, active is 0, max active is 20.  This is within CDC guideline for chronic pain prescribing by primary care.    2. Obesity (BMI 30-39.9)  She continues to reduce weight.  She did diet.  She is not eating very much.  She is vomiting if she eats too late, especially if she eats large amounts of meat.    She will get me the mammogram results.      Patient Active Problem List    Diagnosis Date Noted   • Hyperlipidemia 10/29/2012     Priority: Low   • Cervicogenic headache 2018   • Spondylosis of lumbar region without myelopathy or radiculopathy 2018   • Eosinophilic esophagitis    • Chronic superficial gastritis    • Hepatic cyst    • Obesity (BMI 30-39.9) 2016   • History of motion sickness 2016   • Chronic migraine without aura without status migrainosus, not intractable 2016   • History of peptic ulcer 2016   • GERD (gastroesophageal reflux disease)        Current medicines (including changes today)  Current Outpatient Prescriptions   Medication Sig Dispense Refill   • tramadol (ULTRAM) 50 MG Tab Take 2 Tabs by mouth every 12 hours as needed for Moderate Pain (migraine) for up to 180 days. 120 Tab 5   • gabapentin (NEURONTIN) 300 MG Cap Take 600 mg by mouth every bedtime.  0   •  "pantoprazole (PROTONIX) 40 MG Tablet Delayed Response TK 1 T PO BID 30 MIN B RASHEL AND 30 MIN B DINNER MEAL  11   • ondansetron (ZOFRAN) 4 MG Tab tablet Take 1 Tab by mouth every 6 hours as needed for Nausea/Vomiting. 20 Tab 2   • promethazine (PHENERGAN) 25 MG Suppos Insert 1 Suppository in rectum every 6 hours as needed for Nausea/Vomiting. 12 Suppository 0     No current facility-administered medications for this visit.        Allergies   Allergen Reactions   • Hydrocodone Itching   • Oxycodone Itching       ROS: As per HPI       Objective:     Blood pressure 140/78, pulse 84, temperature 37.1 °C (98.8 °F), resp. rate 16, height 1.651 m (5' 5\"), weight 84.8 kg (187 lb), SpO2 94 %, not currently breastfeeding. Body mass index is 31.12 kg/m².    Physical Exam:  Constitutional: Well-developed and well-nourished. Not diaphoretic. No distress. Lucid and fluent.  Skin: Skin is warm and dry. No rash noted.  Head: Atraumatic without lesions.  Eyes: Conjunctivae and extraocular motions are normal. Pupils are equal, round, and reactive to light. No scleral icterus.   Ears:  External ears unremarkable. Tympanic membranes clear and intact.  Nose: Nares patent. Mucosa without edema or erythema. No discharge. No facial tenderness.  Mouth/Throat: Tongue normal. Oropharynx is clear and moist. Posterior pharynx without erythema or exudates.  Neck: Supple, trachea midline. No thyromegaly present. No cervical or supraclavicular lymphadenopathy. No JVD or carotid bruits appreciated  Cardiovascular: Regular rate and rhythm.  Normal S1, S2 without murmur appreciated.  Chest: Effort normal. Clear to auscultation throughout. No adventitious sounds.   Abdomen: Soft, non tender, and without distention. Active bowel sounds in all four quadrants. No rebound, guarding, masses or hepatosplenomegaly.  Extremities: No cyanosis, clubbing, erythema, nor edema.   Neurological: Alert and oriented x 3.  Psychiatric:  Behavior, mood, and affect are " appropriate.       Assessment and Plan:     52 y.o. female with the following issues:    1. Chronic migraine without aura without status migrainosus, not intractable  tramadol (ULTRAM) 50 MG Tab    CONSENT FOR OPIATE PRESCRIPTION   2. Obesity (BMI 30-39.9)  Patient identified as having weight management issue.  Appropriate orders and counseling given.         Followup: No Follow-up on file.

## 2018-04-05 ENCOUNTER — OFFICE VISIT (OUTPATIENT)
Dept: NEUROLOGY | Facility: MEDICAL CENTER | Age: 53
End: 2018-04-05
Payer: COMMERCIAL

## 2018-04-05 VITALS
WEIGHT: 195.77 LBS | BODY MASS INDEX: 32.62 KG/M2 | SYSTOLIC BLOOD PRESSURE: 122 MMHG | TEMPERATURE: 97.2 F | HEIGHT: 65 IN | HEART RATE: 69 BPM | RESPIRATION RATE: 18 BRPM | OXYGEN SATURATION: 96 % | DIASTOLIC BLOOD PRESSURE: 64 MMHG

## 2018-04-05 DIAGNOSIS — G44.86 CERVICOGENIC HEADACHE: ICD-10-CM

## 2018-04-05 PROCEDURE — 99204 OFFICE O/P NEW MOD 45 MIN: CPT | Performed by: PHYSICIAN ASSISTANT

## 2018-04-05 RX ORDER — TOPIRAMATE 25 MG/1
25 TABLET ORAL 2 TIMES DAILY
Qty: 60 TAB | Refills: 3 | Status: SHIPPED | OUTPATIENT
Start: 2018-04-05 | End: 2018-04-16 | Stop reason: SINTOL

## 2018-04-05 NOTE — PROGRESS NOTES
Subjective:      Flavia Pena is a 52 y.o. female who presents with Establish Care (Migraines)    Chief Complaint/Reason for referral:  headaches    History of Present Illness:   Describe your headaches to me:  (patient is very tangential.  Speaks non-stop despite me interrupting her several times asking her to stop constantly talking)    Long history of migraines but 3-4 years ago she had two falls and struck her head.  This really worsened the migraines.  She was losing her balance easier which she attributes to back pain.  She has seen a spine doctor who is trying to help her figure out why headaches are worsening.    She feels migraines are being triggered by back but spine doctor denies.  He put her on gabapentin and she feels it is helping with the pain but it is wearing off and she keeps having to increase the dose.    She feels her memory is worsening which she attributes to changes in mri.    She also takes tramadol every day for her back and for headache pain.    Migraines began in her 30's.  Around 2000 - time of 9/11.  She was working for [employer] at that time and it was a very stressful time.    Current headache location - come from back - base of neck up over top of head into temples.    Severe pain, stabbing, worsened with activity, vomiting within 15 minutes, lasting more than 4 hours untreated.  Tramadol takes 3 hours to work.    Do you get an aura or any symptoms that typically begin PRIOR to headache onset? no    Are you nauseated or sick to your stomach when you have a headache?  y  Does light bother you when you have a headache?   _____y____  Does sound or noise bother you when you have a headache?   _____y____    Neurologic symptoms with headaches (weakness, numbness, vertigo, speech changes, cognitive changes) no     Have you identified any triggers for your headaches (dehydration, poor sleep, low blood sugar, alcohol 35% (kirsten wine) chocolate 22%, cheese 9%, citrus fruit 11%)?   none    Do you feel restless like you want to pace around with your headaches or do you feel like lying down to make your headache feel better/less severe? Lie down    Do headaches start by coughing, sneezing, bending over, Valsalva maneuver, sexual activity? no    Do headaches start shortly after you lie down to go to bed or shortly after you get up from bed in the morning? no    Have you noticed a menstrual pattern to your headaches? Post menstrual    Have you ever kept a headache diary?    How many days do you keep ANY type of headache in any given month?  3 or fewer days______   Between 3 and 6 days______   Between 6 and 10 days_____  Between 11 and 14 days____  15 or more headache/days per month__X___  Has severe headaches __15__ days per month    Family members with headaches:  None but after talking nonstop for > 1minute sounds like her sister also gets migraines    Co--morbid conditions:    Conditions that affect diagnosis and treatment:    Depression  N        Anxiety     Y        Sleep disorders:   Y     Obesity  Y    History of TBI Y           Pregnancy/family planning  Na/      Fibromyalgia       Social History:  Do you drink any caffeine? Yes_X____ No____   How many days per week?  2 or fewer days______  3 or more days__X____    Do you drink alcohol?  Rarely    Do you use recreational drugs including medicinal marijuana?  none    Do you smoke cigarettes? none    What do you do for work? Works from [employer]    Who and where do you live? 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  Yes - OK    PMH reviewed - no kidney stones, +exercise inducted asthma, no mi, no stroke, no anxiety medications - not that bad per patient    Medications and Allergies Reviewed     What are you taking right now for your headaches/#days per month of acute medication use:     Ultram - daily  Ibuprofen 800 mg - 4 -6 times monthly  Tylenol 1000 mg - 4 times monthly  50 mg  "benadryl    Takes them all with each migraine outset - 3 x month    Prior acute treatments:  Medication/dose/timing/route/worked or side-effects?    Cannot remember what other medicines she actually took    What are you taking right now - daily - to prevent headaches?/dose    none    Any prior prophylactic treatments:  Medications/dose/frequency/duration of treatment/worked or side effects?    denies                                                                                                         HPI    ROS       Objective:     /64   Pulse 69   Temp 36.2 °C (97.2 °F)   Resp 18   Ht 1.651 m (5' 5\")   Wt 88.8 kg (195 lb 12.3 oz)   SpO2 96%   BMI 32.58 kg/m²      Physical Exam  Well developed, well nourished - vital signs reviewed  Alert and Oriented x 3, Affect slightly anxious, Fund of knowledge within normal limits, Memory intact  Cranial Nerves: PERRL, EOMI without nystagmus or opthalmoplegia,   face symmetric in strength and sensation, no facial droop, tongue midline without atrophy or fasiculations, shoulder shrug is normal bilaterally, speech clear and fluent no aphasia - tangential speech  Motor:  Upper and lower extremities 5/5, equal bilaterally.  No drift.  Sensation: Light touch equal and intact in all extremities, No sensory deficits  Cerebellar:  Finger to nose intact.  No dysmetria.  No tremor.  Gait: normal gait without ataxia.  Negative Romberg  CV: nonlabored breathing     Assessment/Plan:     Medication overuse headache/chronic migraine:    Acute/Rescue Medications: max use 9 days monthly - use calendar to track and bring to next visit    Tramadol and with other stuff - refused all other migraine meds as she said they don't work.    Daily Preventative Treatment:  Vitamins - handout provided  Daily medicine -   Initially refused all daily medications and says she has already tried and failed everything but has no medical records.  Discussed possibly trying botox if she can get me " copies of records from other providers who prescribed other required tried and failed meds.  She doesn't think she can find those so we will start over so to speak and begin with topamax.    Other:  ONB and/or Trigger point injection - will not be approved by [employer]      At this time I think first step is to try and reduce frequency of migraines with daily medicine and beginning a vitamin.    Total time with this visit:  45  Minutes face-to-face with patient. More than 50% of this visit was spent educating patient on their illness and/or coordinating care, as detailed above

## 2018-04-05 NOTE — PATIENT INSTRUCTIONS
topamax:    1/2 25 mg PM x 1 week;    Then 1/2 tablet AM and PM x 1 week;    Then 1/2 tab AM and 25 mg PM x 2 weeks;     then 25 mg AM and 25 mg PM x 2 weeks;     Then two options for continued titration every two weeks.    Continue to add 1/2 tablet AM vs PM or just to    25 mg AM & 50 mg PM x 2 weeks, followed by 50 mg AM & PM max target dose.      Do not increase titration further if you have no dull headaches week prior or only 1 migraine that responded to treatment.

## 2018-04-13 ENCOUNTER — PATIENT MESSAGE (OUTPATIENT)
Dept: MEDICAL GROUP | Facility: CLINIC | Age: 53
End: 2018-04-13

## 2018-04-13 DIAGNOSIS — G43.709 CHRONIC MIGRAINE WITHOUT AURA WITHOUT STATUS MIGRAINOSUS, NOT INTRACTABLE: ICD-10-CM

## 2018-04-13 DIAGNOSIS — G89.4 CHRONIC PAIN SYNDROME: ICD-10-CM

## 2018-04-16 PROBLEM — G89.4 CHRONIC PAIN SYNDROME: Status: ACTIVE | Noted: 2018-04-16

## 2018-04-16 RX ORDER — TRAMADOL HYDROCHLORIDE 50 MG/1
100 TABLET ORAL EVERY 12 HOURS PRN
Qty: 58 TAB | Refills: 5 | Status: SHIPPED
Start: 2018-04-16 | End: 2018-08-29 | Stop reason: SDUPTHER

## 2018-04-18 RX ORDER — NORTRIPTYLINE HYDROCHLORIDE 10 MG/1
10 CAPSULE ORAL DAILY
Qty: 30 CAP | Refills: 11 | Status: SHIPPED | OUTPATIENT
Start: 2018-04-18 | End: 2019-07-29

## 2018-07-02 ENCOUNTER — PATIENT MESSAGE (OUTPATIENT)
Dept: MEDICAL GROUP | Facility: CLINIC | Age: 53
End: 2018-07-02

## 2018-07-02 DIAGNOSIS — I24.0 OCCLUSION OF LEFT ANTERIOR DESCENDING (LAD) ARTERY (HCC): ICD-10-CM

## 2018-07-05 ENCOUNTER — PATIENT MESSAGE (OUTPATIENT)
Dept: MEDICAL GROUP | Facility: CLINIC | Age: 53
End: 2018-07-05

## 2018-07-05 DIAGNOSIS — I24.0: ICD-10-CM

## 2018-07-05 DIAGNOSIS — I25.10 CORONARY ATHEROMA: ICD-10-CM

## 2018-07-05 NOTE — TELEPHONE ENCOUNTER
From: Flavia Pena  To: Kian Calderon M.D.  Sent: 7/5/2018 11:32 AM PDT  Subject: Non-Urgent Medical Question    Susy Alva could not get me in until 9/29? However, I was able to get an appointment this Monday at 1pm with [facility] with Dr. Mohsen. They need the referral. Their fax # is [phone].

## 2018-07-06 ENCOUNTER — OFFICE VISIT (OUTPATIENT)
Dept: CARDIOLOGY | Facility: MEDICAL CENTER | Age: 53
End: 2018-07-06
Payer: COMMERCIAL

## 2018-07-06 VITALS
BODY MASS INDEX: 30.16 KG/M2 | WEIGHT: 181 LBS | DIASTOLIC BLOOD PRESSURE: 70 MMHG | HEIGHT: 65 IN | HEART RATE: 68 BPM | OXYGEN SATURATION: 94 % | SYSTOLIC BLOOD PRESSURE: 112 MMHG

## 2018-07-06 DIAGNOSIS — E66.09 CLASS 1 OBESITY DUE TO EXCESS CALORIES WITH SERIOUS COMORBIDITY AND BODY MASS INDEX (BMI) OF 32.0 TO 32.9 IN ADULT: ICD-10-CM

## 2018-07-06 DIAGNOSIS — E78.5 HYPERLIPIDEMIA, UNSPECIFIED HYPERLIPIDEMIA TYPE: ICD-10-CM

## 2018-07-06 DIAGNOSIS — I25.118 CORONARY ARTERY DISEASE OF NATIVE ARTERY OF NATIVE HEART WITH STABLE ANGINA PECTORIS (HCC): ICD-10-CM

## 2018-07-06 DIAGNOSIS — Z79.899 HIGH RISK MEDICATION USE: ICD-10-CM

## 2018-07-06 LAB — EKG IMPRESSION: NORMAL

## 2018-07-06 PROCEDURE — 99245 OFF/OP CONSLTJ NEW/EST HI 55: CPT | Performed by: INTERNAL MEDICINE

## 2018-07-06 PROCEDURE — 93000 ELECTROCARDIOGRAM COMPLETE: CPT | Performed by: INTERNAL MEDICINE

## 2018-07-06 RX ORDER — ROSUVASTATIN CALCIUM 10 MG/1
10 TABLET, COATED ORAL EVERY EVENING
Qty: 30 TAB | Refills: 11 | Status: SHIPPED | OUTPATIENT
Start: 2018-07-06 | End: 2019-07-29

## 2018-07-06 RX ORDER — SULFAMETHOXAZOLE AND TRIMETHOPRIM 800; 160 MG/1; MG/1
TABLET ORAL
Refills: 0 | COMMUNITY
Start: 2018-05-17 | End: 2018-08-29

## 2018-07-06 NOTE — LETTER
[facility]   [address] [place] [address]  [place] [address]  Phone: [phone]  Fax: [phone]              Flavia Pena  1965    Encounter Date: 7/6/2018    Yumiko Rodriguez M.D.          PROGRESS NOTE:  Chief Complaint   Patient presents with   • Hyperlipidemia       Subjective:   Flavia Pena is a 52 y.o. female who presents today for cardiac care and evaluation for abnormal finding of CT angiogram.  Patient has nonspecific chest pain.  It is vague in nature.  She cannot recall exactly what the sensation wise.  She thought it was somewhat pressure-like.  Intermittent duration.  Lasting for minutes at a time.  No exacerbating factors.    I have independently reviewed patient's ECG with patient in clinic today, which shows normal sinus rhythm, normal TN, QT intervals. No evidence of acute coronary syndrome.    Patient did bring in the images of her CT angiogram which showed evidence of plaque in her LAD.  Of note, the CT angiogram was done not because of her symptomatology but because her  had stenting of the LAD and her doctor in [place] wanted to offer the option and patient elected to undergo CT angiogram to further assess her coronary anatomy without symptoms at that time.    Past Medical History:   Diagnosis Date   • Chronic superficial gastritis    • Coronary artery calcification     mild   • Eosinophilic esophagitis    • GERD (gastroesophageal reflux disease)    • Hepatic cyst    • History of diabetes mellitus    • History of peptic ulcer     childhood   • Migraine    • Nodule of tongue    • Schatzki's ring 2/2016    dilated, Dr. Mckeon     Past Surgical History:   Procedure Laterality Date   • APPENDECTOMY     • BLADDER SUSPENSION     • DARWIN BY LAPAROSCOPY     • HYSTERECTOMY, TOTAL ABDOMINAL      polycystic ovarian syndrome   • [id number]     • OOPHORECTOMY Right      Family History   Problem Relation Age of Onset   • Heart  Attack Mother    • Stroke Mother    • Lung Disease Mother    • Hypertension Mother    • Diabetes Mother      type II   • Dementia Mother    • Arthritis Sister      lupus     Social History     Social History   • Marital status:      Spouse name: N/A   • Number of children: N/A   • Years of education: N/A     Occupational History   • Not on file.     Social History Main Topics   • Smoking status: Never Smoker   • Smokeless tobacco: Never Used   • Alcohol use No   • Drug use: No   • Sexual activity: Not on file     Other Topics Concern   • Not on file     Social History Narrative   • No narrative on file     Allergies   Allergen Reactions   • Hydrocodone Itching   • Oxycodone Itching   • Topiramate Anxiety     Hallucinations     Outpatient Encounter Prescriptions as of 7/6/2018   Medication Sig Dispense Refill   • rosuvastatin (CRESTOR) 10 MG Tab Take 1 Tab by mouth every evening. 30 Tab 11   • tramadol (ULTRAM) 50 MG Tab Take 2 Tabs by mouth every 12 hours as needed for Moderate Pain (migraine) for up to 180 days. 58 Tab 5   • gabapentin (NEURONTIN) 300 MG Cap Take 600 mg by mouth every bedtime.  0   • sulfamethoxazole-trimethoprim (BACTRIM DS) 800-160 MG tablet TK 1 T PO BID FOR 7 DAYS  0   • nortriptyline (PAMELOR) 10 MG Cap Take 1 Cap by mouth every day. 30 Cap 11   • pantoprazole (PROTONIX) 40 MG Tablet Delayed Response TK 1 T PO BID 30 MIN B RASHEL AND 30 MIN B DINNER MEAL  11   • ondansetron (ZOFRAN) 4 MG Tab tablet Take 1 Tab by mouth every 6 hours as needed for Nausea/Vomiting. 20 Tab 2   • promethazine (PHENERGAN) 25 MG Suppos Insert 1 Suppository in rectum every 6 hours as needed for Nausea/Vomiting. 12 Suppository 0     No facility-administered encounter medications on file as of 7/6/2018.      Review of Systems   Constitutional: Negative for chills, fever, malaise/fatigue and weight loss.   HENT: Negative for ear discharge, ear pain, hearing loss and nosebleeds.    Eyes: Negative for blurred vision,  "double vision, pain and discharge.   Respiratory: Negative for cough and shortness of breath.    Cardiovascular: Positive for chest pain. Negative for palpitations, orthopnea, claudication, leg swelling and PND.   Gastrointestinal: Negative for abdominal pain, blood in stool, melena, nausea and vomiting.   Genitourinary: Negative for dysuria and hematuria.   Musculoskeletal: Negative for falls, joint pain and myalgias.   Skin: Negative for itching and rash.   Neurological: Negative for dizziness, sensory change, speech change, loss of consciousness and headaches.   Endo/Heme/Allergies: Negative for environmental allergies. Does not bruise/bleed easily.   Psychiatric/Behavioral: Negative for depression, hallucinations and suicidal ideas.        Objective:   /70   Pulse 68   Ht 1.651 m (5' 5\")   Wt 82.1 kg (181 lb)   SpO2 94%   BMI 30.12 kg/m²      Physical Exam   Constitutional: She is oriented to person, place, and time. No distress.   Obese in appearance.     HENT:   Head: Normocephalic and atraumatic.   Right Ear: External ear normal.   Left Ear: External ear normal.   Eyes: Right eye exhibits no discharge. Left eye exhibits no discharge.   Neck: No JVD present. No thyromegaly present.   Cardiovascular: Normal rate, regular rhythm, normal heart sounds and intact distal pulses.  Exam reveals no gallop and no friction rub.    No murmur heard.  Pulmonary/Chest: Breath sounds normal. No respiratory distress.   Abdominal: Bowel sounds are normal. She exhibits no distension. There is no tenderness.   Musculoskeletal: She exhibits no edema or tenderness.   Neurological: She is alert and oriented to person, place, and time. No cranial nerve deficit.   Skin: Skin is warm and dry. She is not diaphoretic.   Psychiatric: She has a normal mood and affect. Her behavior is normal.   Nursing note and vitals reviewed.      Assessment:     1. Coronary artery disease of native artery of native heart with stable angina " pectoris (HCC)  rosuvastatin (CRESTOR) 10 MG Tab    NM-CARDIAC PET    ECHOCARDIOGRAM COMP W/O CONT   2. Hyperlipidemia, unspecified hyperlipidemia type  EKG    rosuvastatin (CRESTOR) 10 MG Tab    NM-CARDIAC PET    ECHOCARDIOGRAM COMP W/O CONT   3. Class 1 obesity due to excess calories with serious comorbidity and body mass index (BMI) of 32.0 to 32.9 in adult  NM-CARDIAC PET    ECHOCARDIOGRAM COMP W/O CONT   4. High risk medication use  ECHOCARDIOGRAM COMP W/O CONT       Medical Decision Making:  Today's Assessment / Status / Plan:   At this time, to further risk stratify, I will order a transthoracic echocardiogram to assess cardiac and valvular functions. I will also order a myocardial PET scan stress test to assess for coronary ischemia.    I went over the options between invasive versus noninvasive workup.  At this time, patient would like to pursue the noninvasive workup.    In the meantime, to stabilize the plaque, we will start patient on rosuvastatin 10 mg p.o. once a day.    I will see patient back in clinic with lab tests and studies results in 3 months.    I thank you Dr. Calderon for referring patient to our Cardiology Clinic today.        Kian Calderon M.D.  [address] [facility] [address]  [address]  [place] [address]  VIA In Basket

## 2018-07-09 ASSESSMENT — ENCOUNTER SYMPTOMS
SENSORY CHANGE: 0
CHILLS: 0
PALPITATIONS: 0
WEIGHT LOSS: 0
LOSS OF CONSCIOUSNESS: 0
DOUBLE VISION: 0
SPEECH CHANGE: 0
FEVER: 0
DEPRESSION: 0
EYE DISCHARGE: 0
PND: 0
BLURRED VISION: 0
ORTHOPNEA: 0
NAUSEA: 0
HALLUCINATIONS: 0
COUGH: 0
FALLS: 0
VOMITING: 0
SHORTNESS OF BREATH: 0
BRUISES/BLEEDS EASILY: 0
DIZZINESS: 0
ABDOMINAL PAIN: 0
MYALGIAS: 0
HEADACHES: 0
CLAUDICATION: 0
EYE PAIN: 0
BLOOD IN STOOL: 0

## 2018-07-09 NOTE — PROGRESS NOTES
Chief Complaint   Patient presents with   • Hyperlipidemia       Subjective:   Flavia Pena is a 52 y.o. female who presents today for cardiac care and evaluation for abnormal finding of CT angiogram.  Patient has nonspecific chest pain.  It is vague in nature.  She cannot recall exactly what the sensation wise.  She thought it was somewhat pressure-like.  Intermittent duration.  Lasting for minutes at a time.  No exacerbating factors.    I have independently reviewed patient's ECG with patient in clinic today, which shows normal sinus rhythm, normal FL, QT intervals. No evidence of acute coronary syndrome.    Patient did bring in the images of her CT angiogram which showed evidence of plaque in her LAD.  Of note, the CT angiogram was done not because of her symptomatology but because her  had stenting of the LAD and her doctor in [place] wanted to offer the option and patient elected to undergo CT angiogram to further assess her coronary anatomy without symptoms at that time.    Past Medical History:   Diagnosis Date   • Chronic superficial gastritis    • Coronary artery calcification     mild   • Eosinophilic esophagitis    • GERD (gastroesophageal reflux disease)    • Hepatic cyst    • History of diabetes mellitus    • History of peptic ulcer     childhood   • Migraine    • Nodule of tongue    • Schatzki's ring 2/2016    dilated, Dr. Mckeon     Past Surgical History:   Procedure Laterality Date   • APPENDECTOMY     • BLADDER SUSPENSION     • DARWIN BY LAPAROSCOPY     • HYSTERECTOMY, TOTAL ABDOMINAL      polycystic ovarian syndrome   • [id number]     • OOPHORECTOMY Right      Family History   Problem Relation Age of Onset   • Heart Attack Mother    • Stroke Mother    • Lung Disease Mother    • Hypertension Mother    • Diabetes Mother      type II   • Dementia Mother    • Arthritis Sister      lupus     Social History     Social History   • Marital status:      Spouse name: N/A   • Number of  children: N/A   • Years of education: N/A     Occupational History   • Not on file.     Social History Main Topics   • Smoking status: Never Smoker   • Smokeless tobacco: Never Used   • Alcohol use No   • Drug use: No   • Sexual activity: Not on file     Other Topics Concern   • Not on file     Social History Narrative   • No narrative on file     Allergies   Allergen Reactions   • Hydrocodone Itching   • Oxycodone Itching   • Topiramate Anxiety     Hallucinations     Outpatient Encounter Prescriptions as of 7/6/2018   Medication Sig Dispense Refill   • rosuvastatin (CRESTOR) 10 MG Tab Take 1 Tab by mouth every evening. 30 Tab 11   • tramadol (ULTRAM) 50 MG Tab Take 2 Tabs by mouth every 12 hours as needed for Moderate Pain (migraine) for up to 180 days. 58 Tab 5   • gabapentin (NEURONTIN) 300 MG Cap Take 600 mg by mouth every bedtime.  0   • sulfamethoxazole-trimethoprim (BACTRIM DS) 800-160 MG tablet TK 1 T PO BID FOR 7 DAYS  0   • nortriptyline (PAMELOR) 10 MG Cap Take 1 Cap by mouth every day. 30 Cap 11   • pantoprazole (PROTONIX) 40 MG Tablet Delayed Response TK 1 T PO BID 30 MIN B ARSHEL AND 30 MIN B DINNER MEAL  11   • ondansetron (ZOFRAN) 4 MG Tab tablet Take 1 Tab by mouth every 6 hours as needed for Nausea/Vomiting. 20 Tab 2   • promethazine (PHENERGAN) 25 MG Suppos Insert 1 Suppository in rectum every 6 hours as needed for Nausea/Vomiting. 12 Suppository 0     No facility-administered encounter medications on file as of 7/6/2018.      Review of Systems   Constitutional: Negative for chills, fever, malaise/fatigue and weight loss.   HENT: Negative for ear discharge, ear pain, hearing loss and nosebleeds.    Eyes: Negative for blurred vision, double vision, pain and discharge.   Respiratory: Negative for cough and shortness of breath.    Cardiovascular: Positive for chest pain. Negative for palpitations, orthopnea, claudication, leg swelling and PND.   Gastrointestinal: Negative for abdominal pain, blood in  "stool, melena, nausea and vomiting.   Genitourinary: Negative for dysuria and hematuria.   Musculoskeletal: Negative for falls, joint pain and myalgias.   Skin: Negative for itching and rash.   Neurological: Negative for dizziness, sensory change, speech change, loss of consciousness and headaches.   Endo/Heme/Allergies: Negative for environmental allergies. Does not bruise/bleed easily.   Psychiatric/Behavioral: Negative for depression, hallucinations and suicidal ideas.        Objective:   /70   Pulse 68   Ht 1.651 m (5' 5\")   Wt 82.1 kg (181 lb)   SpO2 94%   BMI 30.12 kg/m²     Physical Exam   Constitutional: She is oriented to person, place, and time. No distress.   Obese in appearance.     HENT:   Head: Normocephalic and atraumatic.   Right Ear: External ear normal.   Left Ear: External ear normal.   Eyes: Right eye exhibits no discharge. Left eye exhibits no discharge.   Neck: No JVD present. No thyromegaly present.   Cardiovascular: Normal rate, regular rhythm, normal heart sounds and intact distal pulses.  Exam reveals no gallop and no friction rub.    No murmur heard.  Pulmonary/Chest: Breath sounds normal. No respiratory distress.   Abdominal: Bowel sounds are normal. She exhibits no distension. There is no tenderness.   Musculoskeletal: She exhibits no edema or tenderness.   Neurological: She is alert and oriented to person, place, and time. No cranial nerve deficit.   Skin: Skin is warm and dry. She is not diaphoretic.   Psychiatric: She has a normal mood and affect. Her behavior is normal.   Nursing note and vitals reviewed.      Assessment:     1. Coronary artery disease of native artery of native heart with stable angina pectoris (HCC)  rosuvastatin (CRESTOR) 10 MG Tab    NM-CARDIAC PET    ECHOCARDIOGRAM COMP W/O CONT   2. Hyperlipidemia, unspecified hyperlipidemia type  EKG    rosuvastatin (CRESTOR) 10 MG Tab    NM-CARDIAC PET    ECHOCARDIOGRAM COMP W/O CONT   3. Class 1 obesity due to " excess calories with serious comorbidity and body mass index (BMI) of 32.0 to 32.9 in adult  NM-CARDIAC PET    ECHOCARDIOGRAM COMP W/O CONT   4. High risk medication use  ECHOCARDIOGRAM COMP W/O CONT       Medical Decision Making:  Today's Assessment / Status / Plan:   At this time, to further risk stratify, I will order a transthoracic echocardiogram to assess cardiac and valvular functions. I will also order a myocardial PET scan stress test to assess for coronary ischemia.    I went over the options between invasive versus noninvasive workup.  At this time, patient would like to pursue the noninvasive workup.    In the meantime, to stabilize the plaque, we will start patient on rosuvastatin 10 mg p.o. once a day.    I will see patient back in clinic with lab tests and studies results in 3 months.    I thank you Dr. Calderon for referring patient to our Cardiology Clinic today.

## 2018-08-22 ENCOUNTER — PATIENT MESSAGE (OUTPATIENT)
Dept: MEDICAL GROUP | Facility: CLINIC | Age: 53
End: 2018-08-22

## 2018-08-23 NOTE — TELEPHONE ENCOUNTER
From: Flavia Pena  To: Kian Calderon M.D.  Sent: 8/22/2018 2:28 PM PDT  Subject: Non-Urgent Medical Question    Hello Dr. Calderon,  I have an appt. with you 8/29 because I need a referral to see my dermatologist at [facility]. Can you send over a referral or do I need this appointment with you first?

## 2018-08-29 ENCOUNTER — OFFICE VISIT (OUTPATIENT)
Dept: MEDICAL GROUP | Facility: MEDICAL CENTER | Age: 53
End: 2018-08-29
Payer: COMMERCIAL

## 2018-08-29 VITALS
HEART RATE: 88 BPM | SYSTOLIC BLOOD PRESSURE: 110 MMHG | WEIGHT: 175.6 LBS | HEIGHT: 65 IN | RESPIRATION RATE: 14 BRPM | DIASTOLIC BLOOD PRESSURE: 70 MMHG | OXYGEN SATURATION: 96 % | BODY MASS INDEX: 29.26 KG/M2 | TEMPERATURE: 98.8 F

## 2018-08-29 DIAGNOSIS — L81.9 PIGMENTED SKIN LESION: ICD-10-CM

## 2018-08-29 DIAGNOSIS — G89.4 CHRONIC PAIN SYNDROME: ICD-10-CM

## 2018-08-29 DIAGNOSIS — M75.92 LESION OF LEFT SHOULDER: ICD-10-CM

## 2018-08-29 DIAGNOSIS — Z12.39 BREAST CANCER SCREENING: ICD-10-CM

## 2018-08-29 DIAGNOSIS — G43.709 CHRONIC MIGRAINE WITHOUT AURA WITHOUT STATUS MIGRAINOSUS, NOT INTRACTABLE: ICD-10-CM

## 2018-08-29 PROCEDURE — 99213 OFFICE O/P EST LOW 20 MIN: CPT | Performed by: FAMILY MEDICINE

## 2018-08-29 RX ORDER — TRAMADOL HYDROCHLORIDE 50 MG/1
100 TABLET ORAL EVERY 12 HOURS PRN
Qty: 58 TAB | Refills: 5 | Status: SHIPPED | OUTPATIENT
Start: 2018-08-29 | End: 2020-10-16 | Stop reason: SDUPTHER

## 2018-08-29 RX ORDER — GABAPENTIN 300 MG/1
300 CAPSULE ORAL 2 TIMES DAILY
Qty: 180 CAP | Refills: 3 | Status: SHIPPED | OUTPATIENT
Start: 2018-08-29 | End: 2019-07-29

## 2018-08-29 NOTE — PROGRESS NOTES
Chief Complaint   Patient presents with   • Nevus     left shoulder mole it flakes and comes back    • Migraine       Subjective:     HPI:   Flavia Cristal Pena presents today with the followin. Pigmented skin lesion/ Lesion of left shoulder  She has a 4 mm pigmented lesion on the left shoulder which has been there approximately 6 months she feels.  Sometimes it flakes off and it returns.  She would like me to do a biopsy.  Unfortunately, I do not have those materials here at the office.  I have placed a dermatology referral.    2. Breast cancer screening  Have asked patient to do a follow-up mammogram.  Order is discussed and placed.    3. Chronic migraine without aura without status migrainosus, not intractable  She has chronic severe complex headache.  She has seen neurology in the past.  She has failed beta blockers, verapamil, triptan's and topiramate.  She believes there was another medication tried to but she does not remember the name.  She went to neurology recently to try to treat the migraines with Botox but unfortunately due to lack of documentation they would have to have her try all the previous medications again.  She had adverse reactions to several and does not want to do that.  Recently she was [place] and found that she was headache free for several days.  She and her  [place] see if that makes a big difference in her headaches.  At this point she is using tramadol for rescue.  She is using this twice daily along with the gabapentin.  It does help somewhat but if she misses a dose the migraine becomes very severe.  Frankly, I think this is intractable migraine though she states the pain medication does work pretty well.    4. Chronic pain syndrome  She has complex chronic pain not only her migraine but also chronic pelvic pain.  She has been seeing gynecology and surgery was  suggested but she has a $6000 co-pay which of course makes that completely unaffordable.  Discussed at length what was planned to be done.  Voiced my concerns to her and her  that I do not know if that surgery will achieve in school of reducing her pelvic pain.          Patient Active Problem List    Diagnosis Date Noted   • Hyperlipidemia 10/29/2012     Priority: Low   • Chronic pain syndrome 04/16/2018   • Cervicogenic headache 01/18/2018   • Spondylosis of lumbar region without myelopathy or radiculopathy 01/17/2018   • Eosinophilic esophagitis    • Chronic superficial gastritis    • Hepatic cyst    • Obesity (BMI 30-39.9) 11/21/2016   • History of motion sickness 06/09/2016   • Chronic migraine without aura without status migrainosus, not intractable 02/16/2016   • History of peptic ulcer 01/26/2016   • GERD (gastroesophageal reflux disease)        Current medicines (including changes today)  Current Outpatient Prescriptions   Medication Sig Dispense Refill   • tramadol (ULTRAM) 50 MG Tab Take 2 Tabs by mouth every 12 hours as needed for Moderate Pain (migraine) for up to 180 days. 58 Tab 5   • gabapentin (NEURONTIN) 300 MG Cap Take 1 Cap by mouth 2 Times a Day. 180 Cap 3   • rosuvastatin (CRESTOR) 10 MG Tab Take 1 Tab by mouth every evening. 30 Tab 11   • nortriptyline (PAMELOR) 10 MG Cap Take 1 Cap by mouth every day. 30 Cap 11   • pantoprazole (PROTONIX) 40 MG Tablet Delayed Response TK 1 T PO BID 30 MIN B RASHEL AND 30 MIN B DINNER MEAL  11   • ondansetron (ZOFRAN) 4 MG Tab tablet Take 1 Tab by mouth every 6 hours as needed for Nausea/Vomiting. 20 Tab 2   • promethazine (PHENERGAN) 25 MG Suppos Insert 1 Suppository in rectum every 6 hours as needed for Nausea/Vomiting. 12 Suppository 0     No current facility-administered medications for this visit.        Allergies   Allergen Reactions   • Hydrocodone Itching   • Oxycodone Itching   • Topiramate Anxiety     Hallucinations       ROS: As per HPI        "Objective:     Blood pressure 110/70, pulse 88, temperature 37.1 °C (98.8 °F), resp. rate 14, height 1.651 m (5' 5\"), weight 79.7 kg (175 lb 9.6 oz), SpO2 96 %, not currently breastfeeding. Body mass index is 29.22 kg/m².    Physical Exam:  Constitutional: Well-developed and well-nourished. Not diaphoretic. No distress. Lucid and fluent.  Skin: Skin is warm and dry.  Left shoulder 4 mm scaly brown lesion that is somewhat irregular in contour  Head: Atraumatic without lesions.  Eyes: Conjunctivae and extraocular motions are normal. Pupils are equal, round, and reactive to light. No scleral icterus.   Nose: Nares patent. Mucosa without edema or erythema. No discharge. No facial tenderness.  Mouth/Throat: Tongue normal. Oropharynx is clear and moist. Posterior pharynx without erythema or exudates.  Neck: marked posterior spasm, mild kyphosis. No thyromegaly present. No cervical or supraclavicular lymphadenopathy. No JVD or carotid bruits appreciated  Cardiovascular: Regular rate and rhythm.  Normal S1, S2 without murmur appreciated.  Chest: Effort normal. Clear to auscultation throughout. No adventitious sounds.   Extremities: No cyanosis, clubbing, erythema, nor edema.   Neurological: Alert and oriented x 3. Movements symmetric and strong.  Psychiatric:  Behavior, mood, and affect are appropriate.       Assessment and Plan:     53 y.o. female with the following issues:    1. Pigmented skin lesion  REFERRAL TO DERMATOLOGY   2. Lesion of left shoulder  REFERRAL TO DERMATOLOGY   3. Breast cancer screening  MA-MAMMO SCREENING BILAT W/NAKUL W/CAD   4. Chronic migraine without aura without status migrainosus, not intractable  tramadol (ULTRAM) 50 MG Tab    gabapentin (NEURONTIN) 300 MG Cap   5. Chronic pain syndrome  tramadol (ULTRAM) 50 MG Tab     No aberrant or addictive use of medications has been observed.  No adverse events have been reported.  Patient counseled to not add Tylenol to current regimen.  Patient counseled " to keep medications locked up or under personal control.  Her maximum morphine milligram equivalent level is 20 which is within CDC guidelines for primary care prescribing for non-cancer chronic pain.      Followup: Return in about 6 months (around 2/28/2019), or if symptoms worsen or fail to improve.

## 2018-10-21 DIAGNOSIS — G43.709 CHRONIC MIGRAINE WITHOUT AURA WITHOUT STATUS MIGRAINOSUS, NOT INTRACTABLE: ICD-10-CM

## 2018-10-21 RX ORDER — ACETAZOLAMIDE 250 MG/1
250 TABLET ORAL 2 TIMES DAILY
Qty: 60 TAB | Refills: 0 | Status: SHIPPED | OUTPATIENT
Start: 2018-10-21 | End: 2019-07-29

## 2018-10-22 ENCOUNTER — OFFICE VISIT (OUTPATIENT)
Dept: MEDICAL GROUP | Facility: MEDICAL CENTER | Age: 53
End: 2018-10-22
Payer: COMMERCIAL

## 2018-10-22 VITALS
WEIGHT: 174 LBS | OXYGEN SATURATION: 97 % | TEMPERATURE: 99.1 F | RESPIRATION RATE: 14 BRPM | BODY MASS INDEX: 28.99 KG/M2 | DIASTOLIC BLOOD PRESSURE: 74 MMHG | HEART RATE: 76 BPM | HEIGHT: 65 IN | SYSTOLIC BLOOD PRESSURE: 112 MMHG

## 2018-10-22 DIAGNOSIS — R22.1 MASS OF LEFT SIDE OF NECK: ICD-10-CM

## 2018-10-22 DIAGNOSIS — Z28.21 INFLUENZA VACCINATION DECLINED: ICD-10-CM

## 2018-10-22 PROCEDURE — 99212 OFFICE O/P EST SF 10 MIN: CPT | Performed by: NURSE PRACTITIONER

## 2018-10-22 ASSESSMENT — PATIENT HEALTH QUESTIONNAIRE - PHQ9: CLINICAL INTERPRETATION OF PHQ2 SCORE: 0

## 2018-10-22 NOTE — PROGRESS NOTES
CC: Nodule (in left side of neck x since last office visit. )        HPI:     Flavia is a [facility] patient, current problem is new to me today, presents today for the followin. Mass of left side of neck  Here today stating for the last couple months at most she has been noticing that there is a small bump on the lower left side of her neck.  That she is able to move it with touching it.  Denies there is associated pain or changes in size.  Believes it may have been there and she may have discussed it with her at her last appointment but at the same time she is actually not sure when it started.        Current Outpatient Prescriptions   Medication Sig Dispense Refill   • acetaZOLAMIDE (DIAMOX) 250 MG Tab Take 1 Tab by mouth 2 times a day. 60 Tab 0   • tramadol (ULTRAM) 50 MG Tab Take 2 Tabs by mouth every 12 hours as needed for Moderate Pain (migraine) for up to 180 days. 58 Tab 5   • gabapentin (NEURONTIN) 300 MG Cap Take 1 Cap by mouth 2 Times a Day. 180 Cap 3   • rosuvastatin (CRESTOR) 10 MG Tab Take 1 Tab by mouth every evening. 30 Tab 11   • nortriptyline (PAMELOR) 10 MG Cap Take 1 Cap by mouth every day. 30 Cap 11   • pantoprazole (PROTONIX) 40 MG Tablet Delayed Response TK 1 T PO BID 30 MIN B RASHEL AND 30 MIN B DINNER MEAL  11   • ondansetron (ZOFRAN) 4 MG Tab tablet Take 1 Tab by mouth every 6 hours as needed for Nausea/Vomiting. 20 Tab 2   • promethazine (PHENERGAN) 25 MG Suppos Insert 1 Suppository in rectum every 6 hours as needed for Nausea/Vomiting. 12 Suppository 0     No current facility-administered medications for this visit.      Social History   Substance Use Topics   • Smoking status: Never Smoker   • Smokeless tobacco: Never Used   • Alcohol use No     I reviewed patients allergies, problem list and medications today in EPIC.    ROS: Any/all pertinent positives listed in the HPI, otherwise all others reviewed are negative today.      /74 (BP Location: Left arm, Patient Position: Sitting,  "BP Cuff Size: Adult)   Pulse 76   Temp 37.3 °C (99.1 °F) (Temporal)   Resp 14   Ht 1.651 m (5' 5\")   Wt 78.9 kg (174 lb)   SpO2 97%   BMI 28.96 kg/m²     Exam:    Gen: Alert and oriented, No apparent distress. WDWN  Psych: A+Ox3, normal affect and mood  Skin: Warm, dry and intact. Good turgor   No rashes in visible areas.  Eye: Conjunctiva clear, lids normal  ENMT: Lips without lesions, good dentition   Oropharynx clear.   Neck: No Lymphadenopathy, Thyromegaly, Bruits.   Trachea midline, no masses  Difficult to palpate possible nodule left lower neck near the sternocleidomastoid-at most maybe 5-8 mm.  There is no pronounced nodule or lymph node.  There is no external skin changes.  Lungs: Clear to auscultation bilaterally, no rales or rhonchi   Unlabored respiratory effort.   CV: Regular rate and rhythm, S1, S2. No murmurs.   No Edema  Normal gait      Assessment and Plan.   53 y.o. female with the following issues.    1. Mass of left side of neck  Stable.  Reassurance.  Exam appears fairly benign.  Ultrasound ordered to thoroughly evaluate however.  - US-SOFT TISSUES OF HEAD - NECK; Future    2. Influenza vaccination declined      "

## 2019-07-14 ENCOUNTER — HOSPITAL ENCOUNTER (EMERGENCY)
Facility: MEDICAL CENTER | Age: 54
End: 2019-07-14
Attending: EMERGENCY MEDICINE
Payer: COMMERCIAL

## 2019-07-14 ENCOUNTER — APPOINTMENT (OUTPATIENT)
Dept: RADIOLOGY | Facility: MEDICAL CENTER | Age: 54
End: 2019-07-14
Attending: EMERGENCY MEDICINE
Payer: COMMERCIAL

## 2019-07-14 VITALS
WEIGHT: 181 LBS | DIASTOLIC BLOOD PRESSURE: 95 MMHG | SYSTOLIC BLOOD PRESSURE: 128 MMHG | HEIGHT: 66 IN | HEART RATE: 67 BPM | BODY MASS INDEX: 29.09 KG/M2 | RESPIRATION RATE: 16 BRPM | TEMPERATURE: 97.2 F | OXYGEN SATURATION: 96 %

## 2019-07-14 DIAGNOSIS — K57.92 DIVERTICULITIS: ICD-10-CM

## 2019-07-14 LAB
ALBUMIN SERPL BCP-MCNC: 4.2 G/DL (ref 3.2–4.9)
ALBUMIN/GLOB SERPL: 1.8 G/DL
ALP SERPL-CCNC: 185 U/L (ref 30–99)
ALT SERPL-CCNC: 44 U/L (ref 2–50)
ANION GAP SERPL CALC-SCNC: 9 MMOL/L (ref 0–11.9)
APPEARANCE UR: CLEAR
AST SERPL-CCNC: 27 U/L (ref 12–45)
BASOPHILS # BLD AUTO: 0.4 % (ref 0–1.8)
BASOPHILS # BLD: 0.06 K/UL (ref 0–0.12)
BILIRUB SERPL-MCNC: 0.4 MG/DL (ref 0.1–1.5)
BILIRUB UR QL STRIP.AUTO: NEGATIVE
BUN SERPL-MCNC: 12 MG/DL (ref 8–22)
CALCIUM SERPL-MCNC: 9 MG/DL (ref 8.5–10.5)
CHLORIDE SERPL-SCNC: 107 MMOL/L (ref 96–112)
CO2 SERPL-SCNC: 23 MMOL/L (ref 20–33)
COLOR UR: YELLOW
CREAT SERPL-MCNC: 0.81 MG/DL (ref 0.5–1.4)
EOSINOPHIL # BLD AUTO: 1.67 K/UL (ref 0–0.51)
EOSINOPHIL NFR BLD: 10.6 % (ref 0–6.9)
ERYTHROCYTE [DISTWIDTH] IN BLOOD BY AUTOMATED COUNT: 40.3 FL (ref 35.9–50)
GLOBULIN SER CALC-MCNC: 2.4 G/DL (ref 1.9–3.5)
GLUCOSE SERPL-MCNC: 99 MG/DL (ref 65–99)
GLUCOSE UR STRIP.AUTO-MCNC: NEGATIVE MG/DL
HCG SERPL QL: NEGATIVE
HCT VFR BLD AUTO: 44.4 % (ref 37–47)
HGB BLD-MCNC: 15.2 G/DL (ref 12–16)
IMM GRANULOCYTES # BLD AUTO: 0.11 K/UL (ref 0–0.11)
IMM GRANULOCYTES NFR BLD AUTO: 0.7 % (ref 0–0.9)
KETONES UR STRIP.AUTO-MCNC: ABNORMAL MG/DL
LACTATE BLD-SCNC: 1.5 MMOL/L (ref 0.5–2)
LEUKOCYTE ESTERASE UR QL STRIP.AUTO: NEGATIVE
LIPASE SERPL-CCNC: 13 U/L (ref 11–82)
LYMPHOCYTES # BLD AUTO: 2.91 K/UL (ref 1–4.8)
LYMPHOCYTES NFR BLD: 18.5 % (ref 22–41)
MCH RBC QN AUTO: 29.7 PG (ref 27–33)
MCHC RBC AUTO-ENTMCNC: 34.2 G/DL (ref 33.6–35)
MCV RBC AUTO: 86.7 FL (ref 81.4–97.8)
MICRO URNS: ABNORMAL
MONOCYTES # BLD AUTO: 0.84 K/UL (ref 0–0.85)
MONOCYTES NFR BLD AUTO: 5.4 % (ref 0–13.4)
NEUTROPHILS # BLD AUTO: 10.11 K/UL (ref 2–7.15)
NEUTROPHILS NFR BLD: 64.4 % (ref 44–72)
NITRITE UR QL STRIP.AUTO: NEGATIVE
NRBC # BLD AUTO: 0.02 K/UL
NRBC BLD-RTO: 0.1 /100 WBC
PH UR STRIP.AUTO: 5.5 [PH]
PLATELET # BLD AUTO: 244 K/UL (ref 164–446)
PMV BLD AUTO: 10.5 FL (ref 9–12.9)
POTASSIUM SERPL-SCNC: 3.8 MMOL/L (ref 3.6–5.5)
PROT SERPL-MCNC: 6.6 G/DL (ref 6–8.2)
PROT UR QL STRIP: NEGATIVE MG/DL
RBC # BLD AUTO: 5.12 M/UL (ref 4.2–5.4)
RBC UR QL AUTO: NEGATIVE
SODIUM SERPL-SCNC: 139 MMOL/L (ref 135–145)
SP GR UR STRIP.AUTO: 1.01
UROBILINOGEN UR STRIP.AUTO-MCNC: 0.2 MG/DL
WBC # BLD AUTO: 15.7 K/UL (ref 4.8–10.8)

## 2019-07-14 PROCEDURE — 84703 CHORIONIC GONADOTROPIN ASSAY: CPT

## 2019-07-14 PROCEDURE — A9270 NON-COVERED ITEM OR SERVICE: HCPCS | Performed by: EMERGENCY MEDICINE

## 2019-07-14 PROCEDURE — 83605 ASSAY OF LACTIC ACID: CPT

## 2019-07-14 PROCEDURE — 74177 CT ABD & PELVIS W/CONTRAST: CPT

## 2019-07-14 PROCEDURE — 700111 HCHG RX REV CODE 636 W/ 250 OVERRIDE (IP): Performed by: EMERGENCY MEDICINE

## 2019-07-14 PROCEDURE — 700102 HCHG RX REV CODE 250 W/ 637 OVERRIDE(OP): Performed by: EMERGENCY MEDICINE

## 2019-07-14 PROCEDURE — 96375 TX/PRO/DX INJ NEW DRUG ADDON: CPT

## 2019-07-14 PROCEDURE — 99285 EMERGENCY DEPT VISIT HI MDM: CPT

## 2019-07-14 PROCEDURE — 83690 ASSAY OF LIPASE: CPT

## 2019-07-14 PROCEDURE — 85025 COMPLETE CBC W/AUTO DIFF WBC: CPT

## 2019-07-14 PROCEDURE — 80053 COMPREHEN METABOLIC PANEL: CPT

## 2019-07-14 PROCEDURE — 700117 HCHG RX CONTRAST REV CODE 255: Performed by: EMERGENCY MEDICINE

## 2019-07-14 PROCEDURE — 96365 THER/PROPH/DIAG IV INF INIT: CPT

## 2019-07-14 PROCEDURE — 700105 HCHG RX REV CODE 258: Performed by: EMERGENCY MEDICINE

## 2019-07-14 PROCEDURE — 81003 URINALYSIS AUTO W/O SCOPE: CPT

## 2019-07-14 PROCEDURE — 71045 X-RAY EXAM CHEST 1 VIEW: CPT

## 2019-07-14 RX ORDER — HYDROCODONE BITARTRATE AND ACETAMINOPHEN 5; 325 MG/1; MG/1
1-2 TABLET ORAL EVERY 6 HOURS PRN
Qty: 20 TAB | Refills: 0 | Status: SHIPPED | OUTPATIENT
Start: 2019-07-14 | End: 2019-07-17

## 2019-07-14 RX ORDER — HYDROCODONE BITARTRATE AND ACETAMINOPHEN 5; 325 MG/1; MG/1
2 TABLET ORAL ONCE
Status: COMPLETED | OUTPATIENT
Start: 2019-07-14 | End: 2019-07-14

## 2019-07-14 RX ORDER — AMOXICILLIN AND CLAVULANATE POTASSIUM 875; 125 MG/1; MG/1
1 TABLET, FILM COATED ORAL 2 TIMES DAILY
Qty: 20 TAB | Refills: 0 | Status: SHIPPED | OUTPATIENT
Start: 2019-07-14 | End: 2019-07-24

## 2019-07-14 RX ORDER — ONDANSETRON 2 MG/ML
4 INJECTION INTRAMUSCULAR; INTRAVENOUS ONCE
Status: COMPLETED | OUTPATIENT
Start: 2019-07-14 | End: 2019-07-14

## 2019-07-14 RX ORDER — SODIUM CHLORIDE 9 MG/ML
1000 INJECTION, SOLUTION INTRAVENOUS ONCE
Status: COMPLETED | OUTPATIENT
Start: 2019-07-14 | End: 2019-07-14

## 2019-07-14 RX ORDER — HYDROMORPHONE HYDROCHLORIDE 1 MG/ML
1 INJECTION, SOLUTION INTRAMUSCULAR; INTRAVENOUS; SUBCUTANEOUS ONCE
Status: COMPLETED | OUTPATIENT
Start: 2019-07-14 | End: 2019-07-14

## 2019-07-14 RX ADMIN — IOHEXOL 100 ML: 350 INJECTION, SOLUTION INTRAVENOUS at 13:32

## 2019-07-14 RX ADMIN — HYDROMORPHONE HYDROCHLORIDE 1 MG: 1 INJECTION, SOLUTION INTRAMUSCULAR; INTRAVENOUS; SUBCUTANEOUS at 13:35

## 2019-07-14 RX ADMIN — AMPICILLIN AND SULBACTAM 3 G: 2; 1 INJECTION, POWDER, FOR SOLUTION INTRAVENOUS at 14:24

## 2019-07-14 RX ADMIN — ONDANSETRON 4 MG: 2 INJECTION INTRAMUSCULAR; INTRAVENOUS at 11:54

## 2019-07-14 RX ADMIN — SODIUM CHLORIDE 1000 ML: 9 INJECTION, SOLUTION INTRAVENOUS at 11:54

## 2019-07-14 RX ADMIN — FENTANYL CITRATE 50 MCG: 0.05 INJECTION, SOLUTION INTRAMUSCULAR; INTRAVENOUS at 11:50

## 2019-07-14 RX ADMIN — FAMOTIDINE 20 MG: 10 INJECTION INTRAVENOUS at 11:50

## 2019-07-14 RX ADMIN — HYDROCODONE BITARTRATE AND ACETAMINOPHEN 2 TABLET: 5; 325 TABLET ORAL at 15:24

## 2019-07-14 NOTE — ED NOTES
Pt provided with discharge instructions, prescriptions x2, instructions for follow up appointment with PCP, s/s of when to seek emergency care.  Pt verbalizes understanding.  Pt discharged in good condition.  Pt instructed not to operate vehicle or drink ETOH on narcotic pain medication.   driving pt home.

## 2019-07-14 NOTE — ED NOTES
Pt reports abdominal pain generally on the right, N/V/D x 4 days.  Pt concerned about possible ulcer or e.coli.  Pt reports she took some bactrium and amoxicillin that she had left over at home.  Pt also takes Kratom supplement for migraine headaches.  Pt educated on proper use of antibiotics.

## 2019-07-14 NOTE — ED TRIAGE NOTES
"Flavia Pena  53 y.o. female  Chief Complaint   Patient presents with   • Flank Pain     right flank pain since 7/9. Reports 8/10 \"burning\" pain that begins in pts right abd and radiates to right flank    • Nausea/Vomiting/Diarrhea     Since 7/9.       Pt amb to triage with steady gait for above complaint.   Pt reports decreased urinary flow.  Pt is alert and oriented, speaking in full sentences, follows commands and responds appropriately to questions. NAD. Resp are even and unlabored.  Pt placed in lobby. Pt educated on triage process. Pt encouraged to alert staff for any changes.    "

## 2019-07-14 NOTE — ED PROVIDER NOTES
"ED Provider Note    CHIEF COMPLAINT  Chief Complaint   Patient presents with   • Flank Pain     right flank pain since 7/9. Reports 8/10 \"burning\" pain that begins in pts right abd and radiates to right flank    • Nausea/Vomiting/Diarrhea     Since 7/9.       HPI  Flavia Pena is a 53 y.o. female who presents to the emergency department complaining of abdominal pain nausea vomiting and diarrhea.  The patient says that symptoms began on the ninth of this month with right-sided abdominal discomfort and that seems to have evolved into a lower abdominal pain mostly on the right side but she also feels that the pain is radiating towards her right lower flank.  She has had several episodes of vomiting and diarrhea of nonbloody material.  She does not recognize any specific exacerbating or alleviating factors or precipitating events.  Patient has a history of chronic pain and she has been using Kratom which she feels is the only thing that controls her pain.    REVIEW OF SYSTEMS the patient says that her temperature at home is been 100.  No chest pain or difficulty breathing no pain or discomfort with urination.  All other systems negative    PAST MEDICAL HISTORY  Past Medical History:   Diagnosis Date   • Chronic superficial gastritis    • Coronary artery calcification     mild   • Eosinophilic esophagitis    • GERD (gastroesophageal reflux disease)    • Hepatic cyst    • History of diabetes mellitus    • History of peptic ulcer     childhood   • Migraine    • Nodule of tongue    • Schatzki's ring 2/2016    dilated, Dr. Mckeon       FAMILY HISTORY  Family History   Problem Relation Age of Onset   • Heart Attack Mother    • Stroke Mother    • Lung Disease Mother    • Hypertension Mother    • Diabetes Mother         type II   • Dementia Mother    • Arthritis Sister         lupus       SOCIAL HISTORY  Social History     Social History   • Marital status:      Spouse name: N/A   • Number of children: N/A   • " "Years of education: N/A     Social History Main Topics   • Smoking status: Never Smoker   • Smokeless tobacco: Never Used   • Alcohol use No   • Drug use: No   • Sexual activity: Not on file     Other Topics Concern   • Not on file     Social History Narrative   • No narrative on file       SURGICAL HISTORY  Past Surgical History:   Procedure Laterality Date   • APPENDECTOMY     • BLADDER SUSPENSION     • DARWIN BY LAPAROSCOPY     • HYSTERECTOMY, TOTAL ABDOMINAL      polycystic ovarian syndrome   • [id number]     • OOPHORECTOMY Right        CURRENT MEDICATIONS  Home Medications    **Home medications have not yet been reviewed for this encounter**         ALLERGIES  Allergies   Allergen Reactions   • Oxycodone Itching   • Topiramate Anxiety     Hallucinations       PHYSICAL EXAM  VITAL SIGNS: /95   Pulse 67   Temp 36.2 °C (97.2 °F) (Temporal)   Resp 16   Ht 1.676 m (5' 6\")   Wt 82.1 kg (181 lb)   SpO2 96%   BMI 29.21 kg/m²    Oxygen saturation is interpreted as adequate  Constitutional: Awake talkative and she does not appear distressed or toxic  HENT: Mucous membranes are dry  Eyes: No erythema discharge or jaundice  Neck: Trachea midline no JVD  Cardiovascular: Regular rate and rhythm  Lungs: Clear and equal bilaterally with no apparent difficulty breathing  Abdomen/Back: Soft nondistended with normally active bowel sounds there is mild diffuse tenderness mostly in the right lower quadrant but no rebound guarding or peritoneal findings  Skin: Warm and dry  Musculoskeletal: No acute bony deformity  Neurologic: Awake verbal moving all extremities    Laboratory  CBC shows an elevated white blood cell count of 15.7 hemoglobin is adequate at 15.2 complete metabolic panel is unremarkable except for slightly elevated alk phos of 185 lipase is normal lactic acid level is normal at 1.5 urinalysis is negative for nitrite and leukocyte Estrace ketones were trace positive.  Pregnancy test is " negative.    Radiology  DX-CHEST-PORTABLE (1 VIEW)   Final Result      No acute cardiopulmonary findings.      CT-ABDOMEN-PELVIS WITH   Final Result      1.  Acute diverticulitis in the sigmoid colon. No abscess or pneumoperitoneum is identified.      2.  Atherosclerosis.      3.  Status post cholecystectomy, appendectomy, and hysterectomy.        MEDICAL DECISION MAKING and DISPOSITION  In the emergency department an IV was established the patient was initially given intravenous fentanyl and Zofran and Pepcid, she complained that that did not help her at all and we then tried intravenous Dilaudid 1 mg and she complained that she did not like the way that that medicine made her feel.  The patient told me that she had better relief using hydrocodone in the past so she was given 2 Norco tablets and had no adverse reaction to this.  Because of her report of abnormal fluid losses with vomiting and diarrhea and clinical exam findings of dehydration she was given intravenous normal saline.  Because of her additional complaints noted above she was kept n.p.o. and therefore not given an oral fluid challenge.  The patient was given intravenous Unasyn for diverticulitis.  I have observe the patient for some time now she looks quite comfortable she has had no vomiting or diarrhea while in the emergency department.  At this point in time I reviewed everything in great detail with the patient and her  and I think it is safe to continue treatment on an outpatient basis have written a prescription for a 10-day course of Augmentin.  The patient is  wants a guarantee that she will not get worse and I have advised him that no such guarantee can be given although I have every expectation that the prescribed treatment should be effective if she has any new or worsening symptoms she is to return here at once for recheck.  Otherwise the patient is to follow-up with her primary care doctor during the week for recheck.  I  have also advised discontinuation of the Kraton and I have recommended a well-balanced diet and plenty of water to maintain hydration    IMPRESSION  1.  Acute diverticulitis with no evidence of abscess or perforation           Electronically signed by: Leroy Squires, 7/14/2019 4:57 PM

## 2019-07-15 ENCOUNTER — OFFICE VISIT (OUTPATIENT)
Dept: MEDICAL GROUP | Facility: MEDICAL CENTER | Age: 54
End: 2019-07-15
Payer: COMMERCIAL

## 2019-07-15 VITALS
HEART RATE: 68 BPM | SYSTOLIC BLOOD PRESSURE: 128 MMHG | BODY MASS INDEX: 30.12 KG/M2 | HEIGHT: 65 IN | OXYGEN SATURATION: 97 % | TEMPERATURE: 89.6 F | DIASTOLIC BLOOD PRESSURE: 78 MMHG

## 2019-07-15 DIAGNOSIS — R19.7 DIARRHEA, UNSPECIFIED TYPE: ICD-10-CM

## 2019-07-15 DIAGNOSIS — R10.84 GENERALIZED ABDOMINAL PAIN: ICD-10-CM

## 2019-07-15 DIAGNOSIS — R11.0 NAUSEA: ICD-10-CM

## 2019-07-15 DIAGNOSIS — K57.32 DIVERTICULITIS OF SIGMOID COLON: ICD-10-CM

## 2019-07-15 DIAGNOSIS — R50.9 FEVER, UNSPECIFIED FEVER CAUSE: ICD-10-CM

## 2019-07-15 PROCEDURE — 99214 OFFICE O/P EST MOD 30 MIN: CPT | Performed by: NURSE PRACTITIONER

## 2019-07-15 NOTE — PROGRESS NOTES
"CC: ED Follow-Up (diarrhea, nausea and nausea)        HPI:     Flavia is a patient of Dr. Ingram, all current problems listed below are new to me today, presents today for the followin. Diverticulitis of sigmoid colon/Nausea/Diarrhea, unspecified type/Fever, unspecified fever cause/Generalized abdominal pain  Here today following up from an ER visit yesterday where she was seen related to abdominal pain fever nausea vomiting and diarrhea.  She was diagnosed by CT scan of acute diverticulitis of the sigmoid colon.  She was started on IV antibiotics fluids antinausea medication and IV pain medication and felt that her symptoms improved greatly.  She was discharged yesterday.  She states last episode of vomiting was yesterday morning.  This is typically worse in the night but did not have any last night.  She was able to hold down fluids and had some crackers this morning without any vomiting.  She states she is in abdominal pain again since she is been discharged.  She is compliant with her Augmentin and is taken 2 doses one last night when this morning.  She states she has had some intermittent fevers at home since discharge and the highest was 100.2.  These \"come and go\".    Pain radiates upward and towards the back.  \"Thought it was my kidneys\" CT scan of the kidneys and labs show normal function.    Current Outpatient Prescriptions   Medication Sig Dispense Refill   • amoxicillin-clavulanate (AUGMENTIN) 875-125 MG Tab Take 1 Tab by mouth 2 times a day for 10 days. 20 Tab 0   • HYDROcodone-acetaminophen (NORCO) 5-325 MG Tab per tablet Take 1-2 Tabs by mouth every 6 hours as needed for up to 3 days. Do not drive or drink alcohol or engaged in potentially hazardous activity while taking this medication 20 Tab 0   • acetaZOLAMIDE (DIAMOX) 250 MG Tab Take 1 Tab by mouth 2 times a day. 60 Tab 0   • gabapentin (NEURONTIN) 300 MG Cap Take 1 Cap by mouth 2 Times a Day. 180 Cap 3   • rosuvastatin (CRESTOR) 10 MG Tab " "Take 1 Tab by mouth every evening. (Patient not taking: Reported on 7/15/2019) 30 Tab 11   • nortriptyline (PAMELOR) 10 MG Cap Take 1 Cap by mouth every day. (Patient not taking: Reported on 7/15/2019) 30 Cap 11   • pantoprazole (PROTONIX) 40 MG Tablet Delayed Response TK 1 T PO BID 30 MIN B RASHEL AND 30 MIN B DINNER MEAL  11   • ondansetron (ZOFRAN) 4 MG Tab tablet Take 1 Tab by mouth every 6 hours as needed for Nausea/Vomiting. (Patient not taking: Reported on 7/15/2019) 20 Tab 2   • promethazine (PHENERGAN) 25 MG Suppos Insert 1 Suppository in rectum every 6 hours as needed for Nausea/Vomiting. (Patient not taking: Reported on 7/15/2019) 12 Suppository 0     No current facility-administered medications for this visit.      Social History   Substance Use Topics   • Smoking status: Never Smoker   • Smokeless tobacco: Never Used   • Alcohol use No     I reviewed patients allergies, problem list and medications today in EPIC.    ROS: Any/all pertinent positives listed in the HPI, otherwise all others reviewed are negative today.      /78 (BP Location: Left arm, Patient Position: Sitting, BP Cuff Size: Adult)   Pulse 68   Temp (!) 32 °C (89.6 °F) (Temporal)   Ht 1.651 m (5' 5\")   SpO2 97%   BMI 30.12 kg/m²     Exam:    Gen: Alert and oriented, No apparent distress. WDWN  Psych: A+Ox3, normal affect and mood  Skin: Warm, dry and intact. Good turgor   No rashes in visible areas.  Eye: Conjunctiva clear, lids normal  ENMT: Lips without lesions, good dentition  Lungs: Clear to auscultation bilaterally, no rales or rhonchi   Unlabored respiratory effort.   CV: Regular rate and rhythm, S1, S2. No murmurs.   No Edema  Abd: Soft generalized tenderness mostly in the upper quadrants non distended. Normal active bowel sounds.    No Hepatosplenomegaly, No pulsatile masses.         Assessment and Plan.   53 y.o. female with the following issues.    1. Diverticulitis of sigmoid colon/Nausea/ Diarrhea, unspecified type/. " Fever, unspecified fever cause/ Generalized abdominal pain  Reviewed with patient that given she is having intermittent fevers at home and her pain has returned that I want her to be evaluated in the ER.  I did call and give report to the ER care coordinator.

## 2019-07-29 ENCOUNTER — OFFICE VISIT (OUTPATIENT)
Dept: MEDICAL GROUP | Facility: MEDICAL CENTER | Age: 54
End: 2019-07-29
Payer: COMMERCIAL

## 2019-07-29 VITALS
RESPIRATION RATE: 14 BRPM | HEART RATE: 76 BPM | OXYGEN SATURATION: 96 % | SYSTOLIC BLOOD PRESSURE: 112 MMHG | BODY MASS INDEX: 30.49 KG/M2 | TEMPERATURE: 98.4 F | DIASTOLIC BLOOD PRESSURE: 64 MMHG | WEIGHT: 183 LBS | HEIGHT: 65 IN

## 2019-07-29 DIAGNOSIS — K57.32 SIGMOID DIVERTICULITIS: ICD-10-CM

## 2019-07-29 DIAGNOSIS — K21.9 GASTROESOPHAGEAL REFLUX DISEASE, ESOPHAGITIS PRESENCE NOT SPECIFIED: ICD-10-CM

## 2019-07-29 PROCEDURE — 99213 OFFICE O/P EST LOW 20 MIN: CPT | Performed by: FAMILY MEDICINE

## 2019-07-29 RX ORDER — RANITIDINE 150 MG/1
150 TABLET ORAL DAILY
Qty: 30 TAB | Refills: 6 | COMMUNITY
Start: 2019-07-29 | End: 2020-10-16 | Stop reason: RX

## 2019-07-29 ASSESSMENT — PATIENT HEALTH QUESTIONNAIRE - PHQ9: CLINICAL INTERPRETATION OF PHQ2 SCORE: 0

## 2019-07-29 NOTE — PROGRESS NOTES
Chief Complaint   Patient presents with   • Diverticulitis       Subjective:     HPI:   Flaviaradha Pena presents today with the followin. Sigmoid diverticulitis  She was seen in the emergency room and CT was obtained.  This does show diverticulitis.  Patient has very long-standing constipation since childhood.  Patient still has pain but no longer has fever or chills.  She completed her antibiotics.  Discussed gradual resumption of regular diet.  Recommend aiming for 30 grams of fiber per day.  She is frustrated by this problem and would like to see GI.  I think that is reasonable.    2. Gastroesophageal reflux disease, esophagitis presence not specified  Patient has history of gastroesophageal reflux disease.  Discussed ranitidine.  Patient feels this is helpful for her.  However, she does have occasional difficulty with swallowing and with bloating.  Referral to GI discussed and placed.        Patient Active Problem List    Diagnosis Date Noted   • Hyperlipidemia 10/29/2012     Priority: Low   • Chronic pain syndrome 2018   • Cervicogenic headache 2018   • Spondylosis of lumbar region without myelopathy or radiculopathy 2018   • Eosinophilic esophagitis    • Chronic superficial gastritis    • Hepatic cyst    • Obesity (BMI 30-39.9) 2016   • History of motion sickness 2016   • Chronic migraine without aura without status migrainosus, not intractable 2016   • History of peptic ulcer 2016   • GERD (gastroesophageal reflux disease)        Current medicines (including changes today)  Current Outpatient Prescriptions   Medication Sig Dispense Refill   • raNITidine (ZANTAC) 150 MG Tab Take 1 Tab by mouth every day. 30 Tab 6     No current facility-administered medications for this visit.        Allergies   Allergen Reactions   • Oxycodone Itching   • Topiramate Anxiety     Hallucinations       ROS: As per HPI       Objective:     /64   Pulse 76   Temp 36.9 °C  "(98.4 °F)   Resp 14   Ht 1.651 m (5' 5\")   Wt 83 kg (183 lb)   SpO2 96%  Body mass index is 30.45 kg/m².    Physical Exam:  Constitutional: Well-developed and well-nourished. Not diaphoretic. No distress. Lucid and fluent.  Skin: Skin is warm and dry. No rash noted.  Head: Atraumatic without lesions.  Eyes: Conjunctivae and extraocular motions are normal. Pupils are equal, round, and reactive to light. No scleral icterus.   Mouth/Throat: Tongue normal. Oropharynx is clear and moist. Posterior pharynx without erythema or exudates.  Neck: Supple, trachea midline. No thyromegaly present. No cervical or supraclavicular lymphadenopathy. No JVD or carotid bruits appreciated  Cardiovascular: Regular rate and rhythm.  Normal S1, S2 without murmur appreciated.  Chest: Effort normal. Clear to auscultation throughout. No adventitious sounds.   Abdomen: Soft, hypogastrium is tender, left lower quadrant is a little less so.  There is significant bloating.  Active bowel sounds in all four quadrants. No rebound, guarding, masses or hepatosplenomegaly.  Extremities: No cyanosis, clubbing, erythema, nor edema.   Neurological: Alert and oriented x 3.   Psychiatric:  Behavior, mood, and affect are appropriate.       Assessment and Plan:     53 y.o. female with the following issues:    1. Sigmoid diverticulitis  REFERRAL TO GASTROENTEROLOGY   2. Gastroesophageal reflux disease, esophagitis presence not specified  raNITidine (ZANTAC) 150 MG Tab    REFERRAL TO GASTROENTEROLOGY         Followup: Return in about 3 months (around 10/29/2019), or if symptoms worsen or fail to improve.  "

## 2019-08-26 ENCOUNTER — TELEPHONE (OUTPATIENT)
Dept: MEDICAL GROUP | Facility: MEDICAL CENTER | Age: 54
End: 2019-08-26

## 2019-08-26 ENCOUNTER — DOCUMENTATION (OUTPATIENT)
Dept: MEDICAL GROUP | Facility: MEDICAL CENTER | Age: 54
End: 2019-08-26

## 2019-08-26 DIAGNOSIS — L57.8 SUN-DAMAGED SKIN: ICD-10-CM

## 2019-08-26 DIAGNOSIS — L81.9 PIGMENTED SKIN LESION: ICD-10-CM

## 2019-08-26 NOTE — PROGRESS NOTES
Called Franco from the Derm facility to ler her know that we sent in another referral for coninutation of care

## 2019-12-04 DIAGNOSIS — G43.709 CHRONIC MIGRAINE WITHOUT AURA WITHOUT STATUS MIGRAINOSUS, NOT INTRACTABLE: ICD-10-CM

## 2019-12-04 RX ORDER — ETODOLAC 500 MG/1
500 TABLET, FILM COATED ORAL 2 TIMES DAILY PRN
Qty: 60 TAB | Refills: 4 | Status: SHIPPED | OUTPATIENT
Start: 2019-12-04 | End: 2020-10-13 | Stop reason: SDUPTHER

## 2019-12-04 NOTE — PROGRESS NOTES
She would like to have a prescription for etodolac.  She tried one when her tramadol has run out for an acute migraine and it worked very well.  I have written that prescription but have cautioned her that this can increase problems with the gastritis and is associated with increased risk for heart attack.

## 2020-01-15 DIAGNOSIS — K13.0 LESION OF LIP: ICD-10-CM

## 2020-01-15 NOTE — PROGRESS NOTES
She has seen Dr. Mccullough in the past.  The previous office reset her to is not contracted with health plan of [place].

## 2020-08-24 ENCOUNTER — TELEPHONE (OUTPATIENT)
Dept: MEDICAL GROUP | Facility: MEDICAL CENTER | Age: 55
End: 2020-08-24

## 2020-08-24 DIAGNOSIS — K13.0 LESION OF LIP: ICD-10-CM

## 2020-08-24 DIAGNOSIS — L57.8 SUN-DAMAGED SKIN: ICD-10-CM

## 2020-08-24 DIAGNOSIS — M75.92 LESION OF LEFT SHOULDER: ICD-10-CM

## 2020-08-24 DIAGNOSIS — L81.9 PIGMENTED SKIN LESION: ICD-10-CM

## 2020-08-24 NOTE — TELEPHONE ENCOUNTER
Patient has Derm appt with Dr Mcmanus for whole body check and they need a referral submitted for patient

## 2020-10-13 DIAGNOSIS — G43.709 CHRONIC MIGRAINE WITHOUT AURA WITHOUT STATUS MIGRAINOSUS, NOT INTRACTABLE: ICD-10-CM

## 2020-10-13 RX ORDER — ETODOLAC 500 MG/1
500 TABLET, FILM COATED ORAL 2 TIMES DAILY PRN
Qty: 60 TAB | Refills: 4 | Status: SHIPPED | OUTPATIENT
Start: 2020-10-13 | End: 2021-03-09

## 2020-10-14 DIAGNOSIS — K13.0 LESION OF LIP: ICD-10-CM

## 2020-10-14 DIAGNOSIS — G43.709 CHRONIC MIGRAINE WITHOUT AURA WITHOUT STATUS MIGRAINOSUS, NOT INTRACTABLE: ICD-10-CM

## 2020-10-14 DIAGNOSIS — L81.9 PIGMENTED SKIN LESION: ICD-10-CM

## 2020-10-14 DIAGNOSIS — L57.8 SUN-DAMAGED SKIN: ICD-10-CM

## 2020-10-14 DIAGNOSIS — M75.92 LESION OF LEFT SHOULDER: ICD-10-CM

## 2020-10-16 ENCOUNTER — DOCUMENTATION (OUTPATIENT)
Dept: MEDICAL GROUP | Facility: MEDICAL CENTER | Age: 55
End: 2020-10-16

## 2020-10-16 ENCOUNTER — TELEMEDICINE (OUTPATIENT)
Dept: MEDICAL GROUP | Facility: MEDICAL CENTER | Age: 55
End: 2020-10-16
Payer: COMMERCIAL

## 2020-10-16 VITALS — TEMPERATURE: 98.2 F | HEART RATE: 98 BPM | OXYGEN SATURATION: 96 %

## 2020-10-16 DIAGNOSIS — G89.4 CHRONIC PAIN SYNDROME: ICD-10-CM

## 2020-10-16 DIAGNOSIS — R10.31 RIGHT LOWER QUADRANT ABDOMINAL PAIN: ICD-10-CM

## 2020-10-16 DIAGNOSIS — G43.709 CHRONIC MIGRAINE WITHOUT AURA WITHOUT STATUS MIGRAINOSUS, NOT INTRACTABLE: ICD-10-CM

## 2020-10-16 DIAGNOSIS — K57.92 DIVERTICULITIS: ICD-10-CM

## 2020-10-16 DIAGNOSIS — D72.19 EOSINOPHILIC LEUKOCYTOSIS, UNSPECIFIED TYPE: ICD-10-CM

## 2020-10-16 DIAGNOSIS — Z12.39 SCREENING BREAST EXAMINATION: ICD-10-CM

## 2020-10-16 PROCEDURE — 99213 OFFICE O/P EST LOW 20 MIN: CPT | Mod: 95,CR | Performed by: FAMILY MEDICINE

## 2020-10-16 RX ORDER — TRAMADOL HYDROCHLORIDE 50 MG/1
100 TABLET ORAL EVERY 12 HOURS PRN
Qty: 58 TAB | Refills: 5 | Status: SHIPPED | OUTPATIENT
Start: 2020-10-16 | End: 2021-03-19 | Stop reason: SDUPTHER

## 2020-10-16 NOTE — PROGRESS NOTES
Virtual Visit: Established Patient   This visit was conducted via Zoom  using secure and encrypted videoconferencing technology. The patient was in a private location in the state of [place].    The patient's identity was confirmed and verbal consent was obtained for this virtual visit.      CC: Chronic headache, migraine, chronic right abdominal pain and flank pain                                                                                                                                HPI:   Flavia presents today with the following.    1. Chronic migraine without aura without status migrainosus, not intractable/Chronic pain syndrome  Most of her pain comes from her chronic headaches every day.  She usually wakes up with a level 3 pain that gradually gets worse through the day.  Migraines at least 8-15 per month.  She did try Botox in the past which was of some help but she did not want to be dependent on something that she did have to get injected all the time, particularly a toxin.  She has tried multiple migraine treatments but has had intolerance to most of them and in fact had a very severe reaction to topiramate.  Patient is frustrated.  She finds that her migraines are much better when she is at lower altitude and and higher humidity but her allergies are much worse.    2. Right lower quadrant abdominal pain/Diverticulitis  This is actually mostly right upper quadrant and flank but occasionally right lower quadrant as well.  On her past issue with this year ago she had very severe pain and CT showed inflammation but this was in the sigmoid region and was attributed to diverticulitis.  However, her CT scan from just a few years ago there was no comment of diverticuli.  I do not understand her persistent symptoms.  Despite fiber and good diet she is having frequent bloating and discomfort along with cramping, gas and nausea.  I have asked her to see gastroenterology again.    3. Eosinophilic leukocytosis,  unspecified type  Of note on the CBC that she had completed in the ER July last year when she was ill her eosinophils were surprisingly high.  She has a repeat CBC order in place.  She does have history of multiple allergies.    4. Screening breast examination  Mammogram order discussed and placed.      Patient Active Problem List    Diagnosis Date Noted   • Hyperlipidemia 10/29/2012     Priority: Low   • Chronic pain syndrome 04/16/2018   • Cervicogenic headache 01/18/2018   • Spondylosis of lumbar region without myelopathy or radiculopathy 01/17/2018   • Eosinophilic esophagitis    • Chronic superficial gastritis    • Hepatic cyst    • Obesity (BMI 30-39.9) 11/21/2016   • History of motion sickness 06/09/2016   • Chronic migraine without aura without status migrainosus, not intractable 02/16/2016   • History of peptic ulcer 01/26/2016   • GERD (gastroesophageal reflux disease)        Current Outpatient Medications   Medication Sig Dispense Refill   • tramadol (ULTRAM) 50 MG Tab Take 2 Tabs by mouth every 12 hours as needed for Moderate Pain (migraine) for up to 180 days. 58 Tab 5   • etodolac (LODINE) 500 MG tablet Take 1 Tab by mouth 2 times a day as needed (Migraine pain). Take with food and water every time 60 Tab 4     No current facility-administered medications for this visit.          Allergies as of 10/16/2020 - Reviewed 10/16/2020   Allergen Reaction Noted   • Oxycodone Itching 05/18/2016   • Topiramate Anxiety 04/16/2018        ROS:  Denies, chest pain, Shortness of breath, Edema. Denies fever or chills.    Pulse 98   Temp 36.8 °C (98.2 °F)   SpO2 96% Unable to take BP today.  Pulses from her pulse ox meter.      Physical Exam:  Constitutional: Alert, no distress, well-groomed.  Skin: No rashes in visible areas.  Eye: Round. Conjunctiva clear, No icterus.   ENMT: Lips pink without lesions, good dentition, moist mucous membranes. Phonation normal.  Neck: No masses, no thyromegaly. Moves freely without  pain.  CV: Pulse as reported by patient  Respiratory: Unlabored respiratory effort, no cough or audible wheeze  Psych: Alert and oriented x3, normal affect and mood.      CT and labs with ER visit last year reviewed and discussed    Assessment and Plan.   55 y.o. female with the following issues.    1. Chronic migraine without aura without status migrainosus, not intractable/Chronic pain syndrome  Her chronic pain is mostly headache related.  She does use the tramadol as needed for rescue.  PDMP review shows no inconsistencies.  Consent for opiate prescription is discussed and is mailed to the patient.  She last filled this in December 2018 and has used it sparingly.  - tramadol (ULTRAM) 50 MG Tab; Take 2 Tabs by mouth every 12 hours as needed for Moderate Pain (migraine) for up to 180 days.  Dispense: 58 Tab; Refill: 5  - Consent for Opiate Prescription    2. Right lower quadrant abdominal pain  She has right-sided abdominal pain that varies in location somewhat but is typically starting in the right upper quadrant and radiating down to the right lower quadrant.  CT scan showed sigmoid inflammation which is a little confusing.  Referral to GI discussed and placed.  - REFERRAL TO GASTROENTEROLOGY    3. Diverticulitis  I find this diagnosis a little questionable.  However, I will defer to gastroenterology.    4. Eosinophilic leukocytosis, unspecified type  Follow-up lab order is already placed.  She will get that completed.    5. Screening breast examination  Mammogram order discussed and placed.  - MA-SCREENING MAMMO BILAT W/TOMOSYNTHESIS W/CAD; Future    Recheck in 4 months, sooner as needed.

## 2020-12-15 ENCOUNTER — APPOINTMENT (RX ONLY)
Dept: URBAN - METROPOLITAN AREA CLINIC 20 | Facility: CLINIC | Age: 55
Setting detail: DERMATOLOGY
End: 2020-12-15

## 2020-12-15 DIAGNOSIS — Z71.89 OTHER SPECIFIED COUNSELING: ICD-10-CM

## 2020-12-15 DIAGNOSIS — L90.5 SCAR CONDITIONS AND FIBROSIS OF SKIN: ICD-10-CM

## 2020-12-15 DIAGNOSIS — L81.4 OTHER MELANIN HYPERPIGMENTATION: ICD-10-CM

## 2020-12-15 DIAGNOSIS — Z87.2 PERSONAL HISTORY OF DISEASES OF THE SKIN AND SUBCUTANEOUS TISSUE: ICD-10-CM

## 2020-12-15 DIAGNOSIS — Z85.828 PERSONAL HISTORY OF OTHER MALIGNANT NEOPLASM OF SKIN: ICD-10-CM

## 2020-12-15 DIAGNOSIS — L82.1 OTHER SEBORRHEIC KERATOSIS: ICD-10-CM

## 2020-12-15 DIAGNOSIS — D22 MELANOCYTIC NEVI: ICD-10-CM

## 2020-12-15 DIAGNOSIS — D18.0 HEMANGIOMA: ICD-10-CM

## 2020-12-15 DIAGNOSIS — L82.0 INFLAMED SEBORRHEIC KERATOSIS: ICD-10-CM

## 2020-12-15 PROBLEM — D22.71 MELANOCYTIC NEVI OF RIGHT LOWER LIMB, INCLUDING HIP: Status: ACTIVE | Noted: 2020-12-15

## 2020-12-15 PROBLEM — D22.5 MELANOCYTIC NEVI OF TRUNK: Status: ACTIVE | Noted: 2020-12-15

## 2020-12-15 PROBLEM — D22.72 MELANOCYTIC NEVI OF LEFT LOWER LIMB, INCLUDING HIP: Status: ACTIVE | Noted: 2020-12-15

## 2020-12-15 PROBLEM — D18.01 HEMANGIOMA OF SKIN AND SUBCUTANEOUS TISSUE: Status: ACTIVE | Noted: 2020-12-15

## 2020-12-15 PROBLEM — D22.61 MELANOCYTIC NEVI OF RIGHT UPPER LIMB, INCLUDING SHOULDER: Status: ACTIVE | Noted: 2020-12-15

## 2020-12-15 PROBLEM — D22.62 MELANOCYTIC NEVI OF LEFT UPPER LIMB, INCLUDING SHOULDER: Status: ACTIVE | Noted: 2020-12-15

## 2020-12-15 PROBLEM — D48.5 NEOPLASM OF UNCERTAIN BEHAVIOR OF SKIN: Status: ACTIVE | Noted: 2020-12-15

## 2020-12-15 PROCEDURE — ? COUNSELING

## 2020-12-15 PROCEDURE — 17110 DESTRUCTION B9 LES UP TO 14: CPT

## 2020-12-15 PROCEDURE — 11102 TANGNTL BX SKIN SINGLE LES: CPT | Mod: 59

## 2020-12-15 PROCEDURE — 99203 OFFICE O/P NEW LOW 30 MIN: CPT | Mod: 25

## 2020-12-15 PROCEDURE — ? LIQUID NITROGEN

## 2020-12-15 PROCEDURE — ? ADDITIONAL NOTES

## 2020-12-15 PROCEDURE — ? BIOPSY BY SHAVE METHOD

## 2020-12-15 ASSESSMENT — LOCATION DETAILED DESCRIPTION DERM
LOCATION DETAILED: LEFT MID-UPPER BACK
LOCATION DETAILED: RIGHT VENTRAL PROXIMAL FOREARM
LOCATION DETAILED: RIGHT INFERIOR MEDIAL UPPER BACK
LOCATION DETAILED: LEFT SUPERIOR MEDIAL MIDBACK
LOCATION DETAILED: LEFT PROXIMAL DORSAL FOREARM
LOCATION DETAILED: EPIGASTRIC SKIN
LOCATION DETAILED: RIGHT PROXIMAL PRETIBIAL REGION
LOCATION DETAILED: RIGHT INFERIOR ANTERIOR NECK
LOCATION DETAILED: LEFT VENTRAL DISTAL FOREARM
LOCATION DETAILED: RIGHT ANTERIOR DISTAL UPPER ARM
LOCATION DETAILED: PERIUMBILICAL SKIN
LOCATION DETAILED: RIGHT DISTAL POSTERIOR THIGH
LOCATION DETAILED: LEFT PROXIMAL POSTERIOR THIGH
LOCATION DETAILED: RIGHT PROXIMAL DORSAL FOREARM
LOCATION DETAILED: LEFT INFERIOR CENTRAL MALAR CHEEK
LOCATION DETAILED: LEFT ANTERIOR DISTAL THIGH
LOCATION DETAILED: RIGHT MID-UPPER BACK
LOCATION DETAILED: LEFT CENTRAL MALAR CHEEK
LOCATION DETAILED: RIGHT VENTRAL DISTAL FOREARM
LOCATION DETAILED: LEFT ANTERIOR DISTAL UPPER ARM
LOCATION DETAILED: LEFT DISTAL POSTERIOR THIGH
LOCATION DETAILED: RIGHT PROXIMAL POSTERIOR THIGH
LOCATION DETAILED: LEFT VENTRAL PROXIMAL FOREARM
LOCATION DETAILED: LEFT PROXIMAL PRETIBIAL REGION
LOCATION DETAILED: RIGHT INFERIOR CENTRAL MALAR CHEEK
LOCATION DETAILED: RIGHT ANTERIOR DISTAL THIGH

## 2020-12-15 ASSESSMENT — LOCATION SIMPLE DESCRIPTION DERM
LOCATION SIMPLE: LEFT PRETIBIAL REGION
LOCATION SIMPLE: LEFT UPPER BACK
LOCATION SIMPLE: RIGHT ANTERIOR NECK
LOCATION SIMPLE: LEFT UPPER ARM
LOCATION SIMPLE: RIGHT THIGH
LOCATION SIMPLE: LEFT FOREARM
LOCATION SIMPLE: ABDOMEN
LOCATION SIMPLE: LEFT POSTERIOR THIGH
LOCATION SIMPLE: RIGHT CHEEK
LOCATION SIMPLE: LEFT LOWER BACK
LOCATION SIMPLE: RIGHT UPPER ARM
LOCATION SIMPLE: LEFT CHEEK
LOCATION SIMPLE: RIGHT FOREARM
LOCATION SIMPLE: RIGHT UPPER BACK
LOCATION SIMPLE: RIGHT POSTERIOR THIGH
LOCATION SIMPLE: LEFT THIGH
LOCATION SIMPLE: RIGHT PRETIBIAL REGION

## 2020-12-15 ASSESSMENT — LOCATION ZONE DERM
LOCATION ZONE: FACE
LOCATION ZONE: LEG
LOCATION ZONE: ARM
LOCATION ZONE: TRUNK
LOCATION ZONE: NECK

## 2020-12-15 NOTE — PROCEDURE: ADDITIONAL NOTES
Detail Level: Simple
Additional Notes: Patient with multiple healing scars and excoriations s/p using an at home electrodessication tx (see photos) recently. Discussed that full evaluation of her lesions is difficult given these have been treated recently and healing. Advised to stop this at home treatment so lesions can be fully evaluated at next visit.
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 No pigment remaining.\\nWill request records.

## 2020-12-15 NOTE — PROCEDURE: BIOPSY BY SHAVE METHOD

## 2021-03-07 DIAGNOSIS — M79.672 PAIN OF LEFT HEEL: ICD-10-CM

## 2021-03-09 ENCOUNTER — DOCUMENTATION (OUTPATIENT)
Dept: MEDICAL GROUP | Facility: MEDICAL CENTER | Age: 56
End: 2021-03-09

## 2021-03-09 DIAGNOSIS — G43.709 CHRONIC MIGRAINE WITHOUT AURA WITHOUT STATUS MIGRAINOSUS, NOT INTRACTABLE: ICD-10-CM

## 2021-03-09 RX ORDER — ETODOLAC 500 MG/1
TABLET, FILM COATED ORAL
Qty: 60 TABLET | Refills: 4 | Status: SHIPPED | OUTPATIENT
Start: 2021-03-09 | End: 2021-10-12

## 2021-03-11 DIAGNOSIS — M77.31 CALCANEAL SPUR OF BOTH FEET: ICD-10-CM

## 2021-03-11 DIAGNOSIS — K21.9 GASTROESOPHAGEAL REFLUX DISEASE, UNSPECIFIED WHETHER ESOPHAGITIS PRESENT: ICD-10-CM

## 2021-03-11 DIAGNOSIS — M77.32 CALCANEAL SPUR OF BOTH FEET: ICD-10-CM

## 2021-03-11 DIAGNOSIS — K20.0 EOSINOPHILIC ESOPHAGITIS: ICD-10-CM

## 2021-03-11 DIAGNOSIS — M72.2 PLANTAR FASCIITIS: ICD-10-CM

## 2021-03-11 DIAGNOSIS — K29.30 CHRONIC SUPERFICIAL GASTRITIS, PRESENCE OF BLEEDING UNSPECIFIED: ICD-10-CM

## 2021-03-15 DIAGNOSIS — Z23 NEED FOR VACCINATION: ICD-10-CM

## 2021-03-16 ENCOUNTER — APPOINTMENT (RX ONLY)
Dept: URBAN - METROPOLITAN AREA CLINIC 4 | Facility: CLINIC | Age: 56
Setting detail: DERMATOLOGY
End: 2021-03-16

## 2021-03-16 DIAGNOSIS — D22 MELANOCYTIC NEVI: ICD-10-CM

## 2021-03-16 DIAGNOSIS — Z71.89 OTHER SPECIFIED COUNSELING: ICD-10-CM

## 2021-03-16 PROBLEM — D48.5 NEOPLASM OF UNCERTAIN BEHAVIOR OF SKIN: Status: ACTIVE | Noted: 2021-03-16

## 2021-03-16 PROCEDURE — ? DIAGNOSIS COMMENT

## 2021-03-16 PROCEDURE — ? COUNSELING

## 2021-03-16 PROCEDURE — 11102 TANGNTL BX SKIN SINGLE LES: CPT

## 2021-03-16 PROCEDURE — ? BIOPSY BY SHAVE METHOD

## 2021-03-16 ASSESSMENT — LOCATION DETAILED DESCRIPTION DERM: LOCATION DETAILED: RIGHT MEDIAL MANDIBULAR CHEEK

## 2021-03-16 ASSESSMENT — LOCATION SIMPLE DESCRIPTION DERM: LOCATION SIMPLE: RIGHT CHEEK

## 2021-03-16 ASSESSMENT — LOCATION ZONE DERM: LOCATION ZONE: FACE

## 2021-03-16 NOTE — PROCEDURE: DIAGNOSIS COMMENT
Render Risk Assessment In Note?: no
Comment: Patient had area biopsied previously showing intradermal melanocytic nevus. Recurrent pigment, patient concerned and requests biopsy.
Detail Level: Detailed

## 2021-03-16 NOTE — HPI: SKIN LESION
Is This A New Presentation, Or A Follow-Up?: Skin Lesions
What Type Of Note Output Would You Prefer (Optional)?: Bullet Format
How Severe Is Your Skin Lesion?: mild
Has Your Skin Lesion Been Treated?: been treated
When Was It Treated?: 2020
Which Family Member (Optional)?: Dad

## 2021-03-19 DIAGNOSIS — G89.4 CHRONIC PAIN SYNDROME: ICD-10-CM

## 2021-03-19 DIAGNOSIS — G43.709 CHRONIC MIGRAINE WITHOUT AURA WITHOUT STATUS MIGRAINOSUS, NOT INTRACTABLE: ICD-10-CM

## 2021-03-20 ENCOUNTER — IMMUNIZATION (OUTPATIENT)
Dept: FAMILY PLANNING/WOMEN'S HEALTH CLINIC | Facility: IMMUNIZATION CENTER | Age: 56
End: 2021-03-20
Attending: INTERNAL MEDICINE
Payer: COMMERCIAL

## 2021-03-20 DIAGNOSIS — Z23 ENCOUNTER FOR VACCINATION: Primary | ICD-10-CM

## 2021-03-20 DIAGNOSIS — Z23 NEED FOR VACCINATION: ICD-10-CM

## 2021-03-20 PROCEDURE — 0001A PFIZER SARS-COV-2 VACCINE: CPT

## 2021-03-20 PROCEDURE — 91300 PFIZER SARS-COV-2 VACCINE: CPT

## 2021-03-21 RX ORDER — TRAMADOL HYDROCHLORIDE 50 MG/1
100 TABLET ORAL EVERY 12 HOURS PRN
Qty: 58 TABLET | Refills: 5 | Status: SHIPPED | OUTPATIENT
Start: 2021-03-21 | End: 2021-09-17

## 2021-03-24 DIAGNOSIS — M79.672 PAIN OF LEFT HEEL: ICD-10-CM

## 2021-03-24 DIAGNOSIS — M77.32 CALCANEAL SPUR OF BOTH FEET: ICD-10-CM

## 2021-03-24 DIAGNOSIS — M72.2 PLANTAR FASCIITIS: ICD-10-CM

## 2021-03-24 DIAGNOSIS — M77.31 CALCANEAL SPUR OF BOTH FEET: ICD-10-CM

## 2021-04-09 ENCOUNTER — IMMUNIZATION (OUTPATIENT)
Dept: FAMILY PLANNING/WOMEN'S HEALTH CLINIC | Facility: IMMUNIZATION CENTER | Age: 56
End: 2021-04-09
Attending: INTERNAL MEDICINE
Payer: COMMERCIAL

## 2021-04-09 DIAGNOSIS — Z23 ENCOUNTER FOR VACCINATION: Primary | ICD-10-CM

## 2021-04-09 PROCEDURE — 0002A PFIZER SARS-COV-2 VACCINE: CPT | Performed by: INTERNAL MEDICINE

## 2021-04-09 PROCEDURE — 91300 PFIZER SARS-COV-2 VACCINE: CPT | Performed by: INTERNAL MEDICINE

## 2021-07-22 ENCOUNTER — TELEPHONE (OUTPATIENT)
Dept: MEDICAL GROUP | Facility: MEDICAL CENTER | Age: 56
End: 2021-07-22

## 2021-07-22 NOTE — TELEPHONE ENCOUNTER
[facility] called about DX-OS CALCIS (HEEL) 2+ LEFT [[id number]    They can only do the With out contrast, if we can change the order please, I can   re-fax to [phone].   I don't see the with and with out contrast myself on the order?

## 2021-10-12 DIAGNOSIS — G43.709 CHRONIC MIGRAINE WITHOUT AURA WITHOUT STATUS MIGRAINOSUS, NOT INTRACTABLE: ICD-10-CM

## 2021-10-12 RX ORDER — ETODOLAC 500 MG/1
TABLET, FILM COATED ORAL
Qty: 60 TABLET | Refills: 0 | Status: SHIPPED | OUTPATIENT
Start: 2021-10-12

## 2022-02-01 ENCOUNTER — APPOINTMENT (RX ONLY)
Dept: URBAN - METROPOLITAN AREA CLINIC 6 | Facility: CLINIC | Age: 57
Setting detail: DERMATOLOGY
End: 2022-02-01

## 2022-02-01 DIAGNOSIS — Z87.2 PERSONAL HISTORY OF DISEASES OF THE SKIN AND SUBCUTANEOUS TISSUE: ICD-10-CM

## 2022-02-01 DIAGNOSIS — D22 MELANOCYTIC NEVI: ICD-10-CM

## 2022-02-01 DIAGNOSIS — Z85.828 PERSONAL HISTORY OF OTHER MALIGNANT NEOPLASM OF SKIN: ICD-10-CM

## 2022-02-01 DIAGNOSIS — L82.1 OTHER SEBORRHEIC KERATOSIS: ICD-10-CM

## 2022-02-01 DIAGNOSIS — L30.4 ERYTHEMA INTERTRIGO: ICD-10-CM

## 2022-02-01 DIAGNOSIS — L21.8 OTHER SEBORRHEIC DERMATITIS: ICD-10-CM | Status: INADEQUATELY CONTROLLED

## 2022-02-01 DIAGNOSIS — D18.0 HEMANGIOMA: ICD-10-CM

## 2022-02-01 DIAGNOSIS — Z00.8 ENCOUNTER FOR OTHER GENERAL EXAMINATION: ICD-10-CM

## 2022-02-01 DIAGNOSIS — L81.4 OTHER MELANIN HYPERPIGMENTATION: ICD-10-CM

## 2022-02-01 DIAGNOSIS — Z71.89 OTHER SPECIFIED COUNSELING: ICD-10-CM

## 2022-02-01 PROBLEM — D22.5 MELANOCYTIC NEVI OF TRUNK: Status: ACTIVE | Noted: 2022-02-01

## 2022-02-01 PROBLEM — D18.01 HEMANGIOMA OF SKIN AND SUBCUTANEOUS TISSUE: Status: ACTIVE | Noted: 2022-02-01

## 2022-02-01 PROCEDURE — ? PRESCRIPTION

## 2022-02-01 PROCEDURE — ? SUNSCREEN RECOMMENDATIONS

## 2022-02-01 PROCEDURE — 99214 OFFICE O/P EST MOD 30 MIN: CPT

## 2022-02-01 PROCEDURE — ? REFERRAL CORRESPONDENCE

## 2022-02-01 PROCEDURE — ? SUNSCREEN TREATMENT REGIMEN

## 2022-02-01 PROCEDURE — ? PRESCRIPTION MEDICATION MANAGEMENT

## 2022-02-01 PROCEDURE — ? COUNSELING

## 2022-02-01 RX ORDER — HYDROCORTISONE 25 MG/G
1 CREAM TOPICAL BID
Qty: 30 | Refills: 11 | Status: ERX | COMMUNITY
Start: 2022-02-01

## 2022-02-01 RX ORDER — CLOBETASOL PROPIONATE 0.5 MG/ML
1 SOLUTION TOPICAL QPM
Qty: 50 | Refills: 3 | Status: ERX | COMMUNITY
Start: 2022-02-01

## 2022-02-01 RX ADMIN — HYDROCORTISONE 1: 25 CREAM TOPICAL at 00:00

## 2022-02-01 RX ADMIN — CLOBETASOL PROPIONATE 1: 0.5 SOLUTION TOPICAL at 00:00

## 2022-02-01 ASSESSMENT — LOCATION DETAILED DESCRIPTION DERM
LOCATION DETAILED: RIGHT ANTERIOR PROXIMAL THIGH
LOCATION DETAILED: RIGHT RADIAL DORSAL HAND
LOCATION DETAILED: EPIGASTRIC SKIN
LOCATION DETAILED: LEFT INFERIOR MEDIAL MALAR CHEEK
LOCATION DETAILED: LEFT MEDIAL FRONTAL SCALP
LOCATION DETAILED: RIGHT MEDIAL BREAST 4-5:00 REGION
LOCATION DETAILED: LEFT BUTTOCK
LOCATION DETAILED: PERIUMBILICAL SKIN
LOCATION DETAILED: LEFT ANTERIOR PROXIMAL THIGH
LOCATION DETAILED: LEFT PROXIMAL PRETIBIAL REGION
LOCATION DETAILED: LEFT MEDIAL BREAST 6-7:00 REGION
LOCATION DETAILED: RIGHT MEDIAL SUPERIOR CHEST
LOCATION DETAILED: LEFT ULNAR DORSAL HAND
LOCATION DETAILED: RIGHT LATERAL ABDOMEN
LOCATION DETAILED: RIGHT MID-UPPER BACK
LOCATION DETAILED: RIGHT MEDIAL BREAST 5-6:00 REGION

## 2022-02-01 ASSESSMENT — LOCATION SIMPLE DESCRIPTION DERM
LOCATION SIMPLE: RIGHT THIGH
LOCATION SIMPLE: LEFT THIGH
LOCATION SIMPLE: LEFT HAND
LOCATION SIMPLE: RIGHT UPPER BACK
LOCATION SIMPLE: LEFT BREAST
LOCATION SIMPLE: CHEST
LOCATION SIMPLE: RIGHT BREAST
LOCATION SIMPLE: LEFT CHEEK
LOCATION SIMPLE: LEFT SCALP
LOCATION SIMPLE: RIGHT HAND
LOCATION SIMPLE: LEFT PRETIBIAL REGION
LOCATION SIMPLE: ABDOMEN
LOCATION SIMPLE: LEFT BUTTOCK

## 2022-02-01 ASSESSMENT — LOCATION ZONE DERM
LOCATION ZONE: TRUNK
LOCATION ZONE: LEG
LOCATION ZONE: FACE
LOCATION ZONE: SCALP
LOCATION ZONE: HAND

## 2022-02-01 NOTE — PROCEDURE: PRESCRIPTION MEDICATION MANAGEMENT
Render In Strict Bullet Format?: No
Plan: Advised patient to apply Clobetasol to scalp at night for 1 week then only 2-3 times a week for maintenance.
Detail Level: Zone
Initiate Treatment: Clobetasol
Initiate Treatment: Hydrocortisone 2.5
Plan: Advised patient to apply the Hydrocortisone 2.5 when red and itchy then recommended OTC Desitin for maintenance.